# Patient Record
Sex: MALE | Race: WHITE | NOT HISPANIC OR LATINO | Employment: FULL TIME | URBAN - METROPOLITAN AREA
[De-identification: names, ages, dates, MRNs, and addresses within clinical notes are randomized per-mention and may not be internally consistent; named-entity substitution may affect disease eponyms.]

---

## 2023-03-31 ENCOUNTER — TELEPHONE (OUTPATIENT)
Dept: PRIMARY CARE | Facility: CLINIC | Age: 68
End: 2023-03-31
Payer: COMMERCIAL

## 2023-03-31 DIAGNOSIS — G47.00 INSOMNIA, UNSPECIFIED TYPE: ICD-10-CM

## 2023-03-31 RX ORDER — LEVOTHYROXINE SODIUM 75 UG/1
1 TABLET ORAL DAILY
COMMUNITY
Start: 2019-12-10 | End: 2023-12-27

## 2023-03-31 RX ORDER — FINASTERIDE 5 MG/1
1 TABLET, FILM COATED ORAL DAILY
COMMUNITY
Start: 2018-03-01 | End: 2023-11-07 | Stop reason: WASHOUT

## 2023-03-31 RX ORDER — PHENAZOPYRIDINE HYDROCHLORIDE 200 MG/1
200 TABLET, FILM COATED ORAL
COMMUNITY
Start: 2022-09-21 | End: 2023-11-07 | Stop reason: WASHOUT

## 2023-03-31 RX ORDER — NAPROXEN 500 MG/1
500 TABLET ORAL
COMMUNITY
Start: 2019-10-25 | End: 2023-10-09

## 2023-03-31 RX ORDER — ALFUZOSIN HYDROCHLORIDE 10 MG/1
10 TABLET, EXTENDED RELEASE ORAL NIGHTLY
COMMUNITY
End: 2023-11-07 | Stop reason: WASHOUT

## 2023-03-31 RX ORDER — PANTOPRAZOLE SODIUM 40 MG/1
1 TABLET, DELAYED RELEASE ORAL
COMMUNITY
Start: 2019-11-22 | End: 2023-11-28 | Stop reason: WASHOUT

## 2023-03-31 RX ORDER — HYDROXYZINE HYDROCHLORIDE 10 MG/1
10 TABLET, FILM COATED ORAL NIGHTLY PRN
Qty: 30 TABLET | Refills: 0 | Status: SHIPPED | OUTPATIENT
Start: 2023-03-31 | End: 2023-04-25

## 2023-03-31 RX ORDER — SILDENAFIL 100 MG/1
100 TABLET, FILM COATED ORAL AS NEEDED
COMMUNITY
Start: 2017-11-17 | End: 2023-12-22 | Stop reason: SDUPTHER

## 2023-03-31 RX ORDER — CHLORHEXIDINE GLUCONATE ORAL RINSE 1.2 MG/ML
SOLUTION DENTAL
COMMUNITY
Start: 2023-03-16 | End: 2023-11-07 | Stop reason: WASHOUT

## 2023-03-31 RX ORDER — FLUTICASONE PROPIONATE 50 MCG
2 SPRAY, SUSPENSION (ML) NASAL DAILY
COMMUNITY
Start: 2022-11-01 | End: 2023-11-07 | Stop reason: WASHOUT

## 2023-03-31 NOTE — TELEPHONE ENCOUNTER
Patient called stated that he had total hip replacement on 3/20 he is doing very well, he isn't on any pain meds anymore the only issue he is having that he cant stay asleep since the surgery he did call Dr hollingsworth and he stated he wouldn't put him on any sleeping med so he is calling you to see what he can do so he can stay asleep throughout the night. Advised him you were not in the office until Tuesday but I would send you a message.

## 2023-04-22 DIAGNOSIS — G47.00 INSOMNIA, UNSPECIFIED TYPE: ICD-10-CM

## 2023-04-25 RX ORDER — HYDROXYZINE HYDROCHLORIDE 10 MG/1
10 TABLET, FILM COATED ORAL NIGHTLY PRN
Qty: 30 TABLET | Refills: 2 | OUTPATIENT
Start: 2023-04-25 | End: 2023-11-28

## 2023-10-08 DIAGNOSIS — M25.551 PAIN IN RIGHT HIP: ICD-10-CM

## 2023-10-09 RX ORDER — NAPROXEN 500 MG/1
500 TABLET ORAL
Qty: 60 TABLET | Refills: 3 | Status: SHIPPED | OUTPATIENT
Start: 2023-10-09 | End: 2023-12-22 | Stop reason: SDUPTHER

## 2023-11-06 PROBLEM — R30.0 STRANGURIA: Status: ACTIVE | Noted: 2023-11-06

## 2023-11-06 PROBLEM — L81.4 OTHER MELANIN HYPERPIGMENTATION: Status: ACTIVE | Noted: 2018-01-31

## 2023-11-06 PROBLEM — N52.9 ERECTILE DYSFUNCTION: Status: ACTIVE | Noted: 2023-11-06

## 2023-11-06 PROBLEM — D64.9 ANEMIA: Status: ACTIVE | Noted: 2023-11-06

## 2023-11-06 PROBLEM — D17.0 BENIGN LIPOMATOUS NEOPLASM OF SKIN AND SUBCUTANEOUS TISSUE OF HEAD, FACE AND NECK: Status: ACTIVE | Noted: 2018-01-31

## 2023-11-06 PROBLEM — L72.3 SEBACEOUS CYST: Status: ACTIVE | Noted: 2018-01-31

## 2023-11-06 PROBLEM — E78.5 DYSLIPIDEMIA: Status: ACTIVE | Noted: 2023-11-06

## 2023-11-06 PROBLEM — E03.9 HYPOTHYROIDISM: Status: ACTIVE | Noted: 2023-11-06

## 2023-11-06 PROBLEM — D22.5 MELANOCYTIC NEVI OF TRUNK: Status: ACTIVE | Noted: 2018-01-31

## 2023-11-06 PROBLEM — H90.3 BILATERAL SENSORINEURAL HEARING LOSS: Status: ACTIVE | Noted: 2023-11-06

## 2023-11-06 PROBLEM — G47.33 OBSTRUCTIVE SLEEP APNEA: Status: ACTIVE | Noted: 2023-11-06

## 2023-11-06 PROBLEM — N40.1 BPH WITH OBSTRUCTION/LOWER URINARY TRACT SYMPTOMS: Status: ACTIVE | Noted: 2023-11-06

## 2023-11-06 PROBLEM — E29.1 HYPOGONADISM MALE: Status: ACTIVE | Noted: 2023-11-06

## 2023-11-06 PROBLEM — M72.2 PLANTAR FASCIITIS: Status: ACTIVE | Noted: 2023-11-06

## 2023-11-06 PROBLEM — D22.60 MELANOCYTIC NEVI OF UNSPECIFIED UPPER LIMB, INCLUDING SHOULDER: Status: ACTIVE | Noted: 2018-01-31

## 2023-11-06 PROBLEM — E78.5 HYPERLIPIDEMIA: Status: ACTIVE | Noted: 2023-11-06

## 2023-11-06 PROBLEM — N32.3 BLADDER DIVERTICULUM: Status: ACTIVE | Noted: 2023-11-06

## 2023-11-06 PROBLEM — Z99.89 CPAP (CONTINUOUS POSITIVE AIRWAY PRESSURE) DEPENDENCE: Status: ACTIVE | Noted: 2023-11-06

## 2023-11-06 PROBLEM — H91.90 HEARING LOSS: Status: ACTIVE | Noted: 2023-11-06

## 2023-11-06 PROBLEM — D18.01 HEMANGIOMA OF SKIN AND SUBCUTANEOUS TISSUE: Status: ACTIVE | Noted: 2018-01-31

## 2023-11-06 PROBLEM — M16.11 ARTHRITIS OF RIGHT HIP: Status: ACTIVE | Noted: 2023-11-06

## 2023-11-06 PROBLEM — M19.90 OSTEOARTHRITIS (ARTHRITIS DUE TO WEAR AND TEAR OF JOINTS): Status: ACTIVE | Noted: 2023-11-06

## 2023-11-06 PROBLEM — L85.3 XEROSIS CUTIS: Status: ACTIVE | Noted: 2018-01-31

## 2023-11-06 PROBLEM — N13.8 BPH WITH OBSTRUCTION/LOWER URINARY TRACT SYMPTOMS: Status: ACTIVE | Noted: 2023-11-06

## 2023-11-06 PROBLEM — E66.9 OBESITY WITH BODY MASS INDEX 30 OR GREATER: Status: ACTIVE | Noted: 2023-11-06

## 2023-11-06 PROBLEM — L57.0 ACTINIC KERATOSIS: Status: ACTIVE | Noted: 2018-01-31

## 2023-11-06 PROBLEM — M54.14 THORACIC RADICULITIS: Status: ACTIVE | Noted: 2023-11-06

## 2023-11-06 RX ORDER — IBUPROFEN 800 MG/1
800 TABLET ORAL EVERY 8 HOURS PRN
COMMUNITY
Start: 2023-08-18 | End: 2023-11-07 | Stop reason: WASHOUT

## 2023-11-06 RX ORDER — TOBRAMYCIN 3 MG/ML
SOLUTION/ DROPS OPHTHALMIC
COMMUNITY
End: 2023-11-28 | Stop reason: ALTCHOICE

## 2023-11-07 ENCOUNTER — OFFICE VISIT (OUTPATIENT)
Dept: SLEEP MEDICINE | Facility: CLINIC | Age: 68
End: 2023-11-07
Payer: COMMERCIAL

## 2023-11-07 VITALS
DIASTOLIC BLOOD PRESSURE: 89 MMHG | SYSTOLIC BLOOD PRESSURE: 152 MMHG | WEIGHT: 203 LBS | RESPIRATION RATE: 16 BRPM | TEMPERATURE: 98.7 F | HEIGHT: 69 IN | HEART RATE: 50 BPM | OXYGEN SATURATION: 99 % | BODY MASS INDEX: 30.07 KG/M2

## 2023-11-07 DIAGNOSIS — G47.33 OBSTRUCTIVE SLEEP APNEA: Primary | ICD-10-CM

## 2023-11-07 DIAGNOSIS — Z99.89 CPAP (CONTINUOUS POSITIVE AIRWAY PRESSURE) DEPENDENCE: ICD-10-CM

## 2023-11-07 PROCEDURE — 1036F TOBACCO NON-USER: CPT | Performed by: INTERNAL MEDICINE

## 2023-11-07 PROCEDURE — 99214 OFFICE O/P EST MOD 30 MIN: CPT | Performed by: INTERNAL MEDICINE

## 2023-11-07 PROCEDURE — 1159F MED LIST DOCD IN RCRD: CPT | Performed by: INTERNAL MEDICINE

## 2023-11-07 PROCEDURE — 1160F RVW MEDS BY RX/DR IN RCRD: CPT | Performed by: INTERNAL MEDICINE

## 2023-11-07 PROCEDURE — 3008F BODY MASS INDEX DOCD: CPT | Performed by: INTERNAL MEDICINE

## 2023-11-07 PROCEDURE — 1126F AMNT PAIN NOTED NONE PRSNT: CPT | Performed by: INTERNAL MEDICINE

## 2023-11-07 ASSESSMENT — SLEEP AND FATIGUE QUESTIONNAIRES
HOW LIKELY ARE YOU TO NOD OFF OR FALL ASLEEP WHILE WATCHING TV: SLIGHT CHANCE OF DOZING
ESS TOTAL SCORE: 4
HOW LIKELY ARE YOU TO NOD OFF OR FALL ASLEEP WHILE LYING DOWN TO REST IN THE AFTERNOON WHEN CIRCUMSTANCES PERMIT: MODERATE CHANCE OF DOZING
HOW LIKELY ARE YOU TO NOD OFF OR FALL ASLEEP IN A CAR, WHILE STOPPED FOR A FEW MINUTES IN TRAFFIC: NO CHANCE OF DOZING
HOW LIKELY ARE YOU TO NOD OFF OR FALL ASLEEP WHILE SITTING AND READING: SLIGHT CHANCE OF DOZING
ESS TOTAL SCORE: 4
HOW LIKELY ARE YOU TO NOD OFF OR FALL ASLEEP WHILE SITTING AND TALKING TO SOMEONE: NO CHANCE OF DOZING
HOW LIKELY ARE YOU TO NOD OFF OR FALL ASLEEP WHILE SITTING QUIETLY AFTER LUNCH WITHOUT ALCOHOL: NO CHANCE OF DOZING
HOW LIKELY ARE YOU TO NOD OFF OR FALL ASLEEP WHEN YOU ARE A PASSENGER IN A CAR FOR AN HOUR WITHOUT A BREAK: NO CHANCE OF DOZING
SITING INACTIVE IN A PUBLIC PLACE LIKE A CLASS ROOM OR A MOVIE THEATER: NO CHANCE OF DOZING

## 2023-11-07 NOTE — PATIENT INSTRUCTIONS
"Thank you for coming to the Sleep Medicine Clinic today! Your sleep medicine provider today was: Sharmaine Morocho MD Below is a summary of your treatment plan, patient education, other important information, and our contact numbers.      TREATMENT PLAN     - Start auto-CPAP. Order placed and sent to [facility].  - Please read the \"Patient Education\" section below for more detailed information. Try implementing tips, reminders, strategies, and supportive management.   - If not yet done, please sign up for [employer] to make a future schedule, send prescription requests, or send messages.    Additional patient handouts given today:   None    Referrals:  We are referring you the the following:  None    Follow-up Appointment:   Follow-up 31-90 days after getting new machine.    PATIENT EDUCATION     Obstructive Sleep Apnea (PHILLIP) is a sleep disorder where your upper airway muscles relax during sleep and the airway intermittently and repetitively narrows and collapses leading to partially blocked airway (hypopnea) or completely blocked airway (apnea) which, in turn, can disrupt breathing in sleep, lower oxygen levels while you sleep and cause night time wakings. Because both apnea and hypopnea may cause higher carbon dioxide or low oxygen levels, untreated PHILLIP can lead to heart arrhythmia, elevation of blood pressure, and make it harder for the body to consolidate memory and facilitate metabolism (leading to higher blood sugars at night). Frequent partial arousals occur during sleep resulting in sleep deprivation and daytime sleepiness. PHILLIP is associated with an increased risk of cardiovascular disease, stroke, hypertension, and insulin resistance. Moreover, untreated PHILLIP with excessive daytime sleepiness can increase the risk of motor vehicular accidents.    Some conservative strategies for PHILLIP regardless of PHILLIP severity are:   Positional therapy - Avoid sleeping on your back.   Healthy diet and regular exercise to optimize weight " is highly encouraged.   Avoid alcohol late in the evening and sedative-hypnotics as these substances can make sleep apnea worse.   Improve breathing through the nose with intranasal steroid spray, saline rinse, or antihistamines    Safety: Avoid driving vehicle and operating heavy equipment while sleepy. Drowsy driving may lead to life-threatening motor vehicle accidents. A person driving while sleepy is 5 times more likely to have an accident. If you feel sleepy, pull over and take a short power nap (sleep for less than 30 minutes). Otherwise, ask somebody to drive you.    Treatment options for sleep apnea include weight management, positional therapy, Positive Airway Therapy (PAP) therapy, oral appliance therapy, hypoglossal nerve stimulator (Inspire) and select airway surgeries.    Annual Reminders About Your Sleep Apnea    Your sleep apnea is well controlled based on reviewing your PAP Data Report.     General Reminders:  Continue current machine settings. Continue using machine every night. Need at least 4 hours daily usage.   Remember to clean your mask, tubings, and water chamber regularly as instructed.   Know the replacement schedule of your supplies/ accessories and contact your DME (durable medical equipment) provider if you are due for them.   Avoid driving or operating heavy machinery when drowsy. A person driving while sleepy is 5 times more likely to have an accident. If you feel sleepy, pull over and take a short power nap (sleep for less than 30 minutes). Otherwise, ask somebody to drive you.    [facility] or calling our office if you have any of the following symptoms:  Snoring or stopping breathing while using the machine  Recurrence of fatigue, sleepiness, insomnia, and other symptoms you had prior using machine  Persistent or worsening fatigue or sleepiness despite regular use of machine  Issues tolerating the machine like bloating sensation, air hunger, too much hot air,  too much pressure, taking off mask without recall in the middle of sleep, etc.     Other conservative measures to improve sleep apnea:  Losing weight can lead to some improvement of PHILLIP which means you will need lower pressures in machine to control your PHILLIP. In some patients, they don't need to use the machine after bariatric surgery. Hence, consider medical and/or surgical weight loss especially if your BMI is more than 35.  Avoiding alcohol, sedative-hypnotics, and sedating medications is imperative as these substances can worsen snoring and sleep apnea  If you have nasal congestion or seasonal allergies, improving your breathing through the nose is critical for treating sleep apnea, tolerating CPAP, and improving your sleep; hence, using intranasal steroid spray like Flonase might help. Make sure you know the proper way to use it.  Stay off your back when sleeping.    Common issues with PAP machine:  Mask gets dislodged when turning to the side: Consider getting a CPAP pillow or switching to a mask with hose on top.     Dry mouth:  Your machine has built-in humidifier that heats up the air to prevent dry mouth. It can be adjusted to your comfort. You can try that first and increase setting one level one night at a time to check which setting is comfortable and effective in lessening dry mouth. In some patients with heated hose, adjusting tube temperature to make air warmer can improve dry mouth. If dry mouth persists despite adjusting humidity or tube temperature setting, may apply OTC Biotene gel over the gums at bedtime.  If Biotene gel is not effective, consider trying XEROSTOM gel from [employer].  Also, eliminate or reduce dose of medications that can cause dry mouth if possible. Lastly, may try getting a separate room humidifier machine.    Airleaks: Please call DME as they may need to adjust your mask or refit you with a different kind or different size of mask. In addition, you can ask DME for tips on  "getting a good mask seal and mask fit.     Difficulty tolerating the mask: Contact your DME to try a different kind of mask and/or call office to get a referral to Sleep Psychologist for CPAP desensitization. CPAP desensitization technique is a set of strategies that helps patient cope with claustrophobia and anxiety related to wearing mask. Alternatively, we can do a daytime mini-sleep study called PAP-nap trial wherein you will try on different kinds of mask and the sleep technician will try different pressure settings on CPAP and BPAP machines to see which specific pressure is tolerable and comfortable for you.     Water droplets or moisture within the hose and/or mask: This is called rain-out and it is caused by condensation of too much heated humidity on the cooler walls of the hose. If you have rain-out, turn down humidity settings or get a heated hose. If you already have a heated hose, turn up the \"tube temperature\" of the heated hose. Alternatively, if you don't want to get a heated hose or warmer air, may wrap the CPAP hose with stockings to keep it somewhat warm. Also, you need to place the machine on the floor and lower the hose so that water won't travel upward towards your mask.     Maintaining your CPAP/BPAP device:    The humidification chamber (aka water tank or water chamber) needs to be filled with distilled water to prevent buildup of white deposits in the future. If you cannot find distilled water, you can use tap water but expect to have white deposits buildup seen after prolonged use with tap water. If you start seeing white deposits on the water chamber, you can clean it by filling it with equal parts of distilled white vinegar and water. Let the vinegar-water mixture sit for 2 hours, and then rinse it with running tap water. Clean with soap and water then let it dry.     You should try to keep your machine clean in order to work well. Here are some tips to clean PAP supplies / " accessories:    Clean the humidification chamber (aka water tank) as well as your mask and tubing at least once a week with soap and water.   Alternatively, you can fill a sink or basin with warm water and add a little mild detergent, like Ivory dish soap. Gently wipe your supplies with the soapy water to free all the oils and dirt that may have collected. Once that's done, rinse these items with clean water until the soap is gone and let them air dry. You can hang your tubing over the curtain marialuisa in your bathroom so that it dries.  The mask insert (part of the mask that has contact with your skin) needs to be cleaned with soap and water daily. Another option is to wipe them down with CPAP wipes or baby wipes.    You should replace your mask and tubing frequently in order to prevent bacteria buildup, machine damage, and mask seal issues. The older the mask and hose, the high likelihood that there is bacteria buildup in it especially if they are not cleaned regularly. Dirty filters damage machines because build-up of dust and contaminants can cause machine to over-heat, and in time, damage the motor of machine. Cushions lose their seal over time as most masks are made of plastic and silicone while headgear is made of neoprene. These materials will break down with age and frequent use. Here is the recommended replacement schedule for PAP supplies / accessories:    Twice a month- disposable filters and cushions for nasal mask or nasal pillows.  Once a month- cushion for full face mask  Every 3 months- mask with headgear and PAP tubing (standard or heated hose)  Every 6 months- reusable filter, water chamber, and chin strap     Other useful information:    Some people do not put water in the tank while other people prefers to put water in the tank to prevent mouth dryness. Try to experiment to determine which is more comfortable for you.   In general, new machines have 2 years warranty on parts while health insurance  allows you to have a new machine once every 5 years.     You can also go to the following EDUCATION WEBSITES for further information:   American Academy of Sleep Medicine http://sleepeducation.org  National Sleep Foundation: https://sleepfoundation.org  American Sleep Apnea Association: https://www.sleepapnea.org (for patients with sleep apnea)  Narcolepsy Network: https://www.narcolepsynetwork.org (for patients with narcolepsy)  [employer]: https://www.theDropcolepsy.org (for patients with narcolepsy)  Hypersomnia Foundation: https://www.hypersomniafoundation.org (for patients with idiopathic hypersomnia)  RLS foundation: https://www.rls.org (for patients with restless leg syndrome)    IMPORTANT INFORMATION     Call 911 for medical emergencies.  Our offices are generally open from Monday-Friday, 8 am - 5 pm.   There are no supporting services by either the sleep doctors or their staff on weekends and Holidays, or after 5 PM on weekdays.   If you need to get in touch with me, you may either call my office number or you can use [employer].  If a referral for a test, for CPAP, or for another specialist was made, and you have not heard about scheduling this within a week, please call scheduling at [phone] ([phone]).  If you are unable to make your appointment for clinic or an overnight study, kindly call the office or sleep testing center at least 48 hours in advance to cancel and reschedule.  If you are on CPAP, please bring your device's card and/or the device to each clinic appointment.   In case of problems with PAP machine or mask interface, please contact your [employer] (Durable Medical Equipment) company first. [employer] is the company who provides you the machine and/or PAP supplies.       PRESCRIPTIONS     We require 7 days advanced notice for prescription refills. If we do not receive the request in this time, we cannot guarantee that your medication will be refilled in time.    IMPORTANT PHONE NUMBERS      Sleep Medicine Clinic Fax: [phone]  Appointments (for Pediatric Sleep Clinic): [phone] - option 1  Appointments (for Adult Sleep Clinic): [phone] - option 2  Appointments (For Sleep Studies): [phone] - option 3  Behavioral Sleep Medicine: [phone]  Sleep Surgery: [phone]  Nutrition Service: [phone]  Weight management clinics with endocrinology: [phone]  Bariatric Services: [phone] (includes weight loss medications and weight loss surgery)  [facility]: [phone] (offers holistic approaches to weight management)  ENT (Otolaryngology): [phone]  Headache Clinic (Neurology): [phone]  Neurology: [phone]  Psychiatry: [phone]  Pulmonary Function Testing (PFT) Center: [phone]  Pulmonary Medicine: [phone]  [facility]): [phone]      OUR SLEEP TESTING LOCATIONS     Our team will contact you to schedule your sleep study, however, you can contact us as follow:  Main Phone Line (scheduling only): [phone]), option 3  Adult and Pediatric Locations  [facility] (6 years and older): [facility] - 4th floor ([address] [place] [address]) After hours line: [phone]  [facility] (Main campus: All ages): [facility], 6th floor. After hours line: [phone]   [place] (5 years and older; younger considered on case-by-case basis): [address]; [address], [address]. Scheduling  After hours line: [phone]   [place] (6 years and older): [address]; [address]; [address]   [place] (6 years and older): [address] [facility], [address]  After hours line: [phone]   [facility] (13 years and older) in [place]: [address], 2nd floor  After hours line: [phone]  [facility] (13 year and older): [address] [facility] [address]  After hours line: [phone]     Adult Only  Normal vision: sees adequately in most situations; can see medication labels, newsprint "Locations:   [place] (18 years and older): [address] [place] [address], 2nd floor   [place] (18 years and older): [address] [facility]; 4th floor  After hours line: [phone]   [place] (18 years and older) at [place]: [id number] [place] [address]  After hours line: [phone]     CONTACTING YOUR SLEEP MEDICINE PROVIDER AND SLEEP TEAM      For issues with your machine or mask interface, please call your DME provider first. DME stands for durable medical company. DME is the company who provides you the machine and/or PAP supplies / accessories.   To schedule, cancel, or reschedule SLEEP STUDY APPOINTMENTS, please call the Main Phone Line at [phone] - option 3.   To schedule, cancel, or reschedule CLINIC APPOINTMENTS, you can do it in \"MyChart\", call [phone] to speak with my  (Cassandra Cavazos), or call the Main Phone Line at [phone] - option 2  For CLINICAL QUESTIONS or MEDICATION REFILLS, please call direct line for Adult Sleep Nurses at [phone].   Lastly, you can also send a message directly to your provider through \"My Chart\", which is the email service through your  Records Account: [email or web address]       Here at [facility], we wish you a restful sleep!    "

## 2023-11-07 NOTE — ASSESSMENT & PLAN NOTE
Now on auto-CPAP 8-16 cm H2O and EPR 3  Doing well with improved PHILLIP symptoms.   PAP adherence data reviewed today. Usage days 30/30, days> 4 hours at 100%, residual AHI 4.1.   Due to machine issues beyond repair, recommend getting a new machine such as auto-CPAP at the following settings: 8-16 cm H2O with EPR 3, heated humidification, heated tubings, and mask of preference. Orders sent to [facility].  Discussed 30-day mask guarantee and insurance requirement regarding PAP compliance and follow-up.   Continue supportive management as follows: Lose weight. Stay off your back when sleeping. Avoid smoking, alcohol, and sedating medications if applicable. Don't drive when sleepy.

## 2023-11-07 NOTE — PROGRESS NOTES
[facility] SLEEP MEDICINE CLINIC  FOLLOW-UP VISIT NOTE    Clinic Location: [facility]  Service Date: 11/7/2023  PCP: Austin Bahena MD    HISTORY OF PRESENT ILLNESS     Patient ID: Wisam Olson is a 68 y.o. male who presents for follow-up of PHILLIP on PAP, yearly checkup.     Patient is here alone today.  To review, patient's medical history is notable for obesity (BMI 32), hypothyroidism, sensorineural hearing loss, BPH, ED, and PHILLIP on auto-CPAP.     Interval History  Patient was last seen in 11/1/2022. Plan was to continue PAP and follow-up yearly.  Since last visit, patient has been using machine every night with clinical benefit and no issues on machine. Tolerating pressure, mask, and humidity. Current mask interface being used is Dreamwear nasal mask. Per records, patient is getting supplies thru [employer]   Complains of issues with machine and dry mouth. Per patient, his machine's humidifier is not working as the water level on water tank remains the same even after an 8-hour night use.    The following are patient's perceived benefits of PAP: decreased or no snoring, refreshing sleep, decreased daytime sleepiness and/or fatigue, and better quality of sleep.  He denies symptoms of RLS, parasomnias, and shift-work disorder.     SLEEP STUDY HISTORY (personally reviewed raw data such as interpretation report, data sheet, hypnogram, and titration table if available and applicable)  HSAT 5/14/2013: RDI 21.4, SpO2 brien 78%, time spent at SpO2<89% was 30 minutes  PAP titration 5/10/2018: CPAP was started at 4 to 13 cm H2O. Treatment emergent central apneas noted. No Cheyne-Bardales breathing noted. Overall AHI 17.8, central apnea index 8.7. At CPAP 13 cm H2O with REM supine sleep, AHI 31.4, SpO2 brien 86%. PLMS index 20.3.    PAP Adherence  A PAP adherence data was obtained and reviewed personally today in clinic. (see scanned document in EPIC)  Source of data:  "website download  Machine: [employer]med Airsense 10 Autoset  Setup date: 6/2/2018  Settings:  auto-CPAP 8-16 cm H2O and EPR 3  Date Range: 10/7/2023 to 11/5/2023  Days used: 30/30  >4 hrs usage: 100%  Average usage hours (days used): 7 hours 29 minutes  Pressure: Median 9.5, 95th percentile 11, Maximum 12  Leak: 95th percentile 14.4, maximum 24.3  AHI: 4.1 (RERA index 0, KESHA 2.4)    SLEEP-WAKE SCHEDULE  Bedtime:  10 PM to 10:30 PM daily  Subjective sleep latency: 10 minutes  Difficulty falling asleep: No   Number of awakenings: none. Patient sleeps thru the night after the prostate surgery.  Nocturia: No  Difficulty staying asleep: No   Final wake time:  6:30 AM daily, except onWed and Fri, he plays hockey and has to wake up at 4:45 AM  Out of bed time: 6:30 AM daily, except onWed and Fri, he plays hockey and has to wake up at 4:45 AM  Shift work: No   Naps: no  Average sleep duration (excluding naps): 6-8 hours    SLEEP ENVIRONMENT  Sleep location: bed  Sleep status: sleeps with bed partner  Preferred sleep position: back and side  TV in bedroom: Yes  Room is dark:  Yes  Room is quiet: Yes  Room is cool: Yes    SOCIAL HISTORY  Smoking: no   ETOH: 4 drinks per week  Marijuana: no  Caffeine: 3-4 cups caffeine daily  Sleep aids: no    WEIGHT: lost 13 lbs     Claustrophobia: No     Active Problems, Allergy List, Medication List, and PMH/PSH/FH/Social Hx have been reviewed and reconciled in chart. No significant changes unless documented in the pertinent chart section. Updates made when necessary.     PHYSICAL EXAM     VITAL SIGNS: /89   Pulse 50   Temp 37.1 °C (98.7 °F)   Resp 16   Ht 1.753 m (5' 9\")   Wt 92.1 kg (203 lb)   SpO2 99% Comment: RA  BMI 29.98 kg/m²     NECK CIRCUMFERENCE: 17 inches    Today ESS: 4  Last visit ESS: 3  JERSEY: 2    Physical Exam  Constitutional: Awake, not in distress  Head: normocephalic, atraumatic  Skin: Warm, no rash  Neuro: No tremors, moves all extremities  Psych: alert and " oriented to time, place, and person    RESULTS/DATA     Iron (ug/dL)   Date Value   07/25/2019 94     Ferritin (ug/L)   Date Value   07/25/2019 173       Bicarbonate   Date Value Ref Range Status   03/16/2023 27 21 - 32 mmol/L Final       ASSESSMENT/PLAN     Assessment/Plan   Problem List Items Addressed This Visit             ICD-10-CM    CPAP (continuous positive airway pressure) dependence Z99.89    Obstructive sleep apnea - Primary G47.33     Now on auto-CPAP 8-16 cm H2O and EPR 3  Doing well with improved PHILLIP symptoms.   PAP adherence data reviewed today. Usage days 30/30, days> 4 hours at 100%, residual AHI 4.1.   Due to machine issues beyond repair, recommend getting a new machine such as auto-CPAP at the following settings: 8-16 cm H2O with EPR 3, heated humidification, heated tubings, and mask of preference. Orders sent to [facility].  Discussed 30-day mask guarantee and insurance requirement regarding PAP compliance and follow-up.   Continue supportive management as follows: Lose weight. Stay off your back when sleeping. Avoid smoking, alcohol, and sedating medications if applicable. Don't drive when sleepy.           Relevant Orders    Positive Airway Pressure (PAP) Therapy       All of patient's questions were answered. He verbalizes understanding and agreement with my assessment and plan. Refer to after-visit-summary (AVS) for patient education and more details on troubleshooting issues with PAP usage, proper cleaning instructions of PAP supplies, and usual recommended replacement schedule for each of the PAP supplies.     Follow-up 31-90 days after getting new machine.

## 2023-11-28 ENCOUNTER — HOSPITAL ENCOUNTER (OUTPATIENT)
Dept: OPERATING ROOM | Facility: CLINIC | Age: 68
Setting detail: OUTPATIENT SURGERY
Discharge: HOME | End: 2023-11-28
Payer: COMMERCIAL

## 2023-11-28 VITALS
RESPIRATION RATE: 16 BRPM | TEMPERATURE: 97.7 F | SYSTOLIC BLOOD PRESSURE: 124 MMHG | DIASTOLIC BLOOD PRESSURE: 68 MMHG | HEIGHT: 69 IN | HEART RATE: 56 BPM | BODY MASS INDEX: 30.76 KG/M2 | WEIGHT: 207.67 LBS | OXYGEN SATURATION: 98 %

## 2023-11-28 DIAGNOSIS — Z12.11 ENCOUNTER FOR SCREENING FOR MALIGNANT NEOPLASM OF COLON: Primary | ICD-10-CM

## 2023-11-28 PROBLEM — Z00.00 MEDICARE ANNUAL WELLNESS VISIT, SUBSEQUENT: Status: ACTIVE | Noted: 2023-11-28

## 2023-11-28 PROCEDURE — 2500000004 HC RX 250 GENERAL PHARMACY W/ HCPCS (ALT 636 FOR OP/ED): Performed by: INTERNAL MEDICINE

## 2023-11-28 PROCEDURE — 45378 DIAGNOSTIC COLONOSCOPY: CPT | Performed by: INTERNAL MEDICINE

## 2023-11-28 PROCEDURE — 3600000002 HC OR TIME - INITIAL BASE CHARGE - PROCEDURE LEVEL TWO

## 2023-11-28 PROCEDURE — 3600000007 HC OR TIME - EACH INCREMENTAL 1 MINUTE - PROCEDURE LEVEL TWO

## 2023-11-28 PROCEDURE — 7100000009 HC PHASE TWO TIME - INITIAL BASE CHARGE

## 2023-11-28 PROCEDURE — 7100000010 HC PHASE TWO TIME - EACH INCREMENTAL 1 MINUTE

## 2023-11-28 PROCEDURE — 99152 MOD SED SAME PHYS/QHP 5/>YRS: CPT | Mod: 59

## 2023-11-28 RX ORDER — MIDAZOLAM HYDROCHLORIDE 1 MG/ML
INJECTION, SOLUTION INTRAMUSCULAR; INTRAVENOUS AS NEEDED
Status: COMPLETED | OUTPATIENT
Start: 2023-11-28 | End: 2023-11-28

## 2023-11-28 RX ORDER — SODIUM CHLORIDE, SODIUM LACTATE, POTASSIUM CHLORIDE, CALCIUM CHLORIDE 600; 310; 30; 20 MG/100ML; MG/100ML; MG/100ML; MG/100ML
20 INJECTION, SOLUTION INTRAVENOUS CONTINUOUS
Status: CANCELLED | OUTPATIENT
Start: 2023-11-28

## 2023-11-28 RX ORDER — FENTANYL CITRATE 50 UG/ML
INJECTION, SOLUTION INTRAMUSCULAR; INTRAVENOUS AS NEEDED
Status: COMPLETED | OUTPATIENT
Start: 2023-11-28 | End: 2023-11-28

## 2023-11-28 RX ADMIN — FENTANYL CITRATE 50 MCG: 50 INJECTION, SOLUTION INTRAMUSCULAR; INTRAVENOUS at 08:38

## 2023-11-28 RX ADMIN — FENTANYL CITRATE 25 MCG: 50 INJECTION, SOLUTION INTRAMUSCULAR; INTRAVENOUS at 08:42

## 2023-11-28 RX ADMIN — MIDAZOLAM 2 MG: 1 INJECTION INTRAMUSCULAR; INTRAVENOUS at 08:38

## 2023-11-28 RX ADMIN — MIDAZOLAM 1 MG: 1 INJECTION INTRAMUSCULAR; INTRAVENOUS at 08:42

## 2023-11-28 ASSESSMENT — PAIN SCALES - GENERAL
PAINLEVEL_OUTOF10: 0 - NO PAIN

## 2023-11-28 ASSESSMENT — COLUMBIA-SUICIDE SEVERITY RATING SCALE - C-SSRS
1. IN THE PAST MONTH, HAVE YOU WISHED YOU WERE DEAD OR WISHED YOU COULD GO TO SLEEP AND NOT WAKE UP?: NO
2. HAVE YOU ACTUALLY HAD ANY THOUGHTS OF KILLING YOURSELF?: NO
6. HAVE YOU EVER DONE ANYTHING, STARTED TO DO ANYTHING, OR PREPARED TO DO ANYTHING TO END YOUR LIFE?: NO

## 2023-11-28 ASSESSMENT — PAIN - FUNCTIONAL ASSESSMENT: PAIN_FUNCTIONAL_ASSESSMENT: 0-10

## 2023-11-28 NOTE — H&P
Error History Of Present Illness  Wisam Olson is a 68 y.o. male presenting for screening colonoscopy.  Last colonoscopy 10 years ago.     Past Medical History  Past Medical History:   Diagnosis Date    Contact with and (suspected) exposure to covid-19     Exposure to COVID-19 virus    Fever, unspecified 01/15/2021    Low grade fever    Pain in unspecified foot 02/23/2015    Heel pain    Personal history of other diseases of the digestive system     History of gastrointestinal hemorrhage    Personal history of other diseases of the respiratory system 01/22/2021    History of upper respiratory infection    Personal history of pneumonia (recurrent) 04/24/2019    History of community acquired pneumonia    Prostate cancer (CMS/HCC)     Shortness of breath     SOB (shortness of breath) on exertion    Trigger finger, unspecified finger 11/15/2016    Acquired trigger finger    Unspecified injury of thorax, initial encounter 06/20/2018    Rib injury    Unspecified injury of unspecified foot, initial encounter 02/23/2015    Injury of heel    Unspecified injury of unspecified wrist, hand and finger(s), initial encounter 04/14/2014    Wrist injury    Unspecified injury of unspecified wrist, hand and finger(s), initial encounter 04/24/2014    Hand injury       Surgical History  Past Surgical History:   Procedure Laterality Date    COLONOSCOPY  01/05/2015    Complete Colonoscopy    OTHER SURGICAL HISTORY  10/07/2021    Tonsillectomy with adenoidectomy    PROSTATE SURGERY      REFRACTIVE SURGERY  05/10/2013    Corneal LASIK        Social History  He reports that he has quit smoking. His smoking use included cigarettes. He has never used smokeless tobacco. He reports that he does not drink alcohol and does not use drugs.    Family History  Family History   Problem Relation Name Age of Onset    Aneurysm Mother      Alcohol abuse Brother          Allergies  Codeine and Penicillins    Review of Systems  A 10+ point review of systems was  "completed and otherwise negative.      Physical Exam  CONSTITUTIONAL: no acute distress, appears stated age  PULMONARY: clear to auscultation bilaterally  CARDIOVASCULAR: regular rate and rhythm  ABDOMEN: soft, non-tender  NEUROLOGIC: alert and oriented to person/place/time       Last Recorded Vitals  Blood pressure 144/75, pulse 60, temperature 36.4 °C (97.5 °F), temperature source Temporal, resp. rate 20, height 1.753 m (5' 9\"), weight 94.2 kg (207 lb 10.8 oz), SpO2 95 %.    Relevant Results           Assessment/Plan   Active Problems:  There are no active Hospital Problems.      Will proceed with screening colonoscopy             Rico Hogue MD    "

## 2023-11-30 ENCOUNTER — ANCILLARY PROCEDURE (OUTPATIENT)
Dept: RADIOLOGY | Facility: CLINIC | Age: 68
End: 2023-11-30
Payer: COMMERCIAL

## 2023-11-30 DIAGNOSIS — E78.5 HYPERLIPIDEMIA, UNSPECIFIED: ICD-10-CM

## 2023-11-30 PROCEDURE — 75571 CT HRT W/O DYE W/CA TEST: CPT

## 2023-12-22 ENCOUNTER — OFFICE VISIT (OUTPATIENT)
Dept: PRIMARY CARE | Facility: CLINIC | Age: 68
End: 2023-12-22
Payer: COMMERCIAL

## 2023-12-22 VITALS
OXYGEN SATURATION: 96 % | SYSTOLIC BLOOD PRESSURE: 130 MMHG | HEART RATE: 61 BPM | DIASTOLIC BLOOD PRESSURE: 76 MMHG | RESPIRATION RATE: 16 BRPM | BODY MASS INDEX: 31.19 KG/M2 | TEMPERATURE: 97.2 F | WEIGHT: 210.6 LBS | HEIGHT: 69 IN

## 2023-12-22 DIAGNOSIS — R91.1 LUNG NODULE: ICD-10-CM

## 2023-12-22 DIAGNOSIS — M25.551 PAIN IN RIGHT HIP: ICD-10-CM

## 2023-12-22 DIAGNOSIS — Z00.00 MEDICARE ANNUAL WELLNESS VISIT, SUBSEQUENT: ICD-10-CM

## 2023-12-22 DIAGNOSIS — Z12.5 SPECIAL SCREENING FOR MALIGNANT NEOPLASM OF PROSTATE: ICD-10-CM

## 2023-12-22 DIAGNOSIS — N52.34 ERECTILE DYSFUNCTION FOLLOWING SIMPLE PROSTATECTOMY: Primary | ICD-10-CM

## 2023-12-22 DIAGNOSIS — D64.9 ANEMIA, UNSPECIFIED TYPE: ICD-10-CM

## 2023-12-22 DIAGNOSIS — E66.9 CLASS 1 OBESITY WITH BODY MASS INDEX (BMI) OF 31.0 TO 31.9 IN ADULT, UNSPECIFIED OBESITY TYPE, UNSPECIFIED WHETHER SERIOUS COMORBIDITY PRESENT: ICD-10-CM

## 2023-12-22 DIAGNOSIS — Z78.9 NEVER SMOKED CIGARETTES: ICD-10-CM

## 2023-12-22 DIAGNOSIS — Z23 NEED FOR VACCINATION: ICD-10-CM

## 2023-12-22 PROBLEM — N13.8 BPH WITH OBSTRUCTION/LOWER URINARY TRACT SYMPTOMS: Status: RESOLVED | Noted: 2023-11-06 | Resolved: 2023-12-22

## 2023-12-22 PROBLEM — M72.2 PLANTAR FASCIITIS: Status: RESOLVED | Noted: 2023-11-06 | Resolved: 2023-12-22

## 2023-12-22 PROBLEM — N40.1 BPH WITH OBSTRUCTION/LOWER URINARY TRACT SYMPTOMS: Status: RESOLVED | Noted: 2023-11-06 | Resolved: 2023-12-22

## 2023-12-22 PROBLEM — E29.1 HYPOGONADISM MALE: Status: RESOLVED | Noted: 2023-11-06 | Resolved: 2023-12-22

## 2023-12-22 LAB
ALBUMIN SERPL BCP-MCNC: 4.4 G/DL (ref 3.4–5)
ALP SERPL-CCNC: 51 U/L (ref 33–136)
ALT SERPL W P-5'-P-CCNC: 16 U/L (ref 10–52)
ANION GAP SERPL CALC-SCNC: 13 MMOL/L (ref 10–20)
AST SERPL W P-5'-P-CCNC: 17 U/L (ref 9–39)
BILIRUB SERPL-MCNC: 0.8 MG/DL (ref 0–1.2)
BUN SERPL-MCNC: 26 MG/DL (ref 6–23)
CALCIUM SERPL-MCNC: 9.3 MG/DL (ref 8.6–10.3)
CHLORIDE SERPL-SCNC: 105 MMOL/L (ref 98–107)
CHOLEST SERPL-MCNC: 193 MG/DL (ref 0–199)
CHOLESTEROL/HDL RATIO: 4
CO2 SERPL-SCNC: 27 MMOL/L (ref 21–32)
CREAT SERPL-MCNC: 1.13 MG/DL (ref 0.5–1.3)
ERYTHROCYTE [DISTWIDTH] IN BLOOD BY AUTOMATED COUNT: 16.1 % (ref 11.5–14.5)
GFR SERPL CREATININE-BSD FRML MDRD: 71 ML/MIN/1.73M*2
GLUCOSE SERPL-MCNC: 77 MG/DL (ref 74–99)
HCT VFR BLD AUTO: 38.3 % (ref 41–52)
HDLC SERPL-MCNC: 48.1 MG/DL
HGB BLD-MCNC: 11.9 G/DL (ref 13.5–17.5)
LDLC SERPL CALC-MCNC: 124 MG/DL
MCH RBC QN AUTO: 21.2 PG (ref 26–34)
MCHC RBC AUTO-ENTMCNC: 31.1 G/DL (ref 32–36)
MCV RBC AUTO: 68 FL (ref 80–100)
NON HDL CHOLESTEROL: 145 MG/DL (ref 0–149)
NRBC BLD-RTO: 0 /100 WBCS (ref 0–0)
PLATELET # BLD AUTO: 213 X10*3/UL (ref 150–450)
POTASSIUM SERPL-SCNC: 4.8 MMOL/L (ref 3.5–5.3)
PROT SERPL-MCNC: 7 G/DL (ref 6.4–8.2)
RBC # BLD AUTO: 5.61 X10*6/UL (ref 4.5–5.9)
SODIUM SERPL-SCNC: 140 MMOL/L (ref 136–145)
TRIGL SERPL-MCNC: 104 MG/DL (ref 0–149)
VLDL: 21 MG/DL (ref 0–40)
WBC # BLD AUTO: 6.4 X10*3/UL (ref 4.4–11.3)

## 2023-12-22 PROCEDURE — 36415 COLL VENOUS BLD VENIPUNCTURE: CPT

## 2023-12-22 PROCEDURE — 84153 ASSAY OF PSA TOTAL: CPT

## 2023-12-22 PROCEDURE — 83550 IRON BINDING TEST: CPT

## 2023-12-22 PROCEDURE — 82728 ASSAY OF FERRITIN: CPT

## 2023-12-22 PROCEDURE — 80053 COMPREHEN METABOLIC PANEL: CPT

## 2023-12-22 PROCEDURE — 90471 IMMUNIZATION ADMIN: CPT | Performed by: FAMILY MEDICINE

## 2023-12-22 PROCEDURE — 1036F TOBACCO NON-USER: CPT | Performed by: FAMILY MEDICINE

## 2023-12-22 PROCEDURE — 90662 IIV NO PRSV INCREASED AG IM: CPT | Performed by: FAMILY MEDICINE

## 2023-12-22 PROCEDURE — 99397 PER PM REEVAL EST PAT 65+ YR: CPT | Performed by: FAMILY MEDICINE

## 2023-12-22 PROCEDURE — 1126F AMNT PAIN NOTED NONE PRSNT: CPT | Performed by: FAMILY MEDICINE

## 2023-12-22 PROCEDURE — 1159F MED LIST DOCD IN RCRD: CPT | Performed by: FAMILY MEDICINE

## 2023-12-22 PROCEDURE — 83540 ASSAY OF IRON: CPT

## 2023-12-22 PROCEDURE — 1160F RVW MEDS BY RX/DR IN RCRD: CPT | Performed by: FAMILY MEDICINE

## 2023-12-22 PROCEDURE — 80061 LIPID PANEL: CPT

## 2023-12-22 PROCEDURE — 85027 COMPLETE CBC AUTOMATED: CPT

## 2023-12-22 PROCEDURE — 3008F BODY MASS INDEX DOCD: CPT | Performed by: FAMILY MEDICINE

## 2023-12-22 RX ORDER — NAPROXEN 500 MG/1
500 TABLET ORAL
Qty: 90 TABLET | Refills: 3 | Status: SHIPPED | OUTPATIENT
Start: 2023-12-22

## 2023-12-22 RX ORDER — SILDENAFIL 100 MG/1
100 TABLET, FILM COATED ORAL AS NEEDED
Qty: 30 TABLET | Refills: 11 | Status: SHIPPED | OUTPATIENT
Start: 2023-12-22 | End: 2024-12-21

## 2023-12-22 ASSESSMENT — ENCOUNTER SYMPTOMS
HEMATURIA: 0
DEPRESSION: 0
HALLUCINATIONS: 0
SHORTNESS OF BREATH: 0
BACK PAIN: 0
ABDOMINAL PAIN: 0
DYSURIA: 0
COUGH: 0
EYE REDNESS: 0
EYE PAIN: 0
OCCASIONAL FEELINGS OF UNSTEADINESS: 0
HEADACHES: 0
LOSS OF SENSATION IN FEET: 0
PALPITATIONS: 0
BRUISES/BLEEDS EASILY: 0
FEVER: 0
ADENOPATHY: 0
CHILLS: 0
RHINORRHEA: 0
NECK STIFFNESS: 0
POLYDIPSIA: 0
WOUND: 0
FATIGUE: 0
WEAKNESS: 0
BLOOD IN STOOL: 0
SORE THROAT: 0

## 2023-12-22 ASSESSMENT — PROMIS GLOBAL HEALTH SCALE
EMOTIONAL_PROBLEMS: NEVER
RATE_PHYSICAL_HEALTH: EXCELLENT
RATE_AVERAGE_FATIGUE: MILD
RATE_QUALITY_OF_LIFE: EXCELLENT
RATE_GENERAL_HEALTH: VERY GOOD
RATE_AVERAGE_PAIN: 0
RATE_MENTAL_HEALTH: EXCELLENT
RATE_SOCIAL_SATISFACTION: EXCELLENT
CARRYOUT_SOCIAL_ACTIVITIES: EXCELLENT
CARRYOUT_PHYSICAL_ACTIVITIES: COMPLETELY

## 2023-12-22 ASSESSMENT — PATIENT HEALTH QUESTIONNAIRE - PHQ9
1. LITTLE INTEREST OR PLEASURE IN DOING THINGS: NOT AT ALL
2. FEELING DOWN, DEPRESSED OR HOPELESS: NOT AT ALL
SUM OF ALL RESPONSES TO PHQ9 QUESTIONS 1 AND 2: 0

## 2023-12-22 NOTE — PROGRESS NOTES
Wisam Olson is a 68 y.o. male with Chief Complaint of Annual Exam (CPE).    Past Surgical History:   Procedure Laterality Date    COLONOSCOPY  2015    Complete Colonoscopy    JOINT REPLACEMENT      OTHER SURGICAL HISTORY  10/07/2021    Tonsillectomy with adenoidectomy    PROSTATE SURGERY      REFRACTIVE SURGERY  05/10/2013    Corneal LASIK    WISDOM TOOTH EXTRACTION        Social History     Socioeconomic History    Marital status: Single     Spouse name: Not on file    Number of children: Not on file    Years of education: Not on file    Highest education level: Not on file   Occupational History    Not on file   Tobacco Use    Smoking status: Former     Packs/day: 1.50     Years: 27.00     Additional pack years: 0.00     Total pack years: 40.50     Types: Cigarettes     Quit date: 1999     Years since quittin.9    Smokeless tobacco: Never   Vaping Use    Vaping Use: Never used   Substance and Sexual Activity    Alcohol use: Never    Drug use: Never    Sexual activity: Defer   Other Topics Concern    Not on file   Social History Narrative    Not on file     Social Determinants of Health     Financial Resource Strain: Not on file   Food Insecurity: Not on file   Transportation Needs: Not on file   Physical Activity: Not on file   Stress: Not on file   Social Connections: Not on file   Intimate Partner Violence: Not on file   Housing Stability: Not on file     Past Medical History:   Diagnosis Date    BPH with obstruction/lower urinary tract symptoms 2023    s/p HoLEP removal - [facility] ([facility])  OFF finasteride. OFF afluzosin    Contact with and (suspected) exposure to covid-19     Exposure to COVID-19 virus    Fever, unspecified 01/15/2021    Low grade fever    Hypogonadism male 2023    Fatigue and low testosterone (300) in the past. Still low 303 spring 2016.   Discussed plusses and minuses of testosterone supplements.   May help ed and fatigue but may worsen bph, prostate cancer, high  red cells, and heart disease.     Pain in unspecified foot 02/23/2015    Heel pain    Personal history of other diseases of the digestive system     History of gastrointestinal hemorrhage    Personal history of other diseases of the respiratory system 01/22/2021    History of upper respiratory infection    Personal history of pneumonia (recurrent) 04/24/2019    History of community acquired pneumonia    Plantar fasciitis 11/06/2023    Shortness of breath     SOB (shortness of breath) on exertion    Trigger finger, unspecified finger 11/15/2016    Acquired trigger finger    Unspecified injury of thorax, initial encounter 06/20/2018    Rib injury    Unspecified injury of unspecified foot, initial encounter 02/23/2015    Injury of heel    Unspecified injury of unspecified wrist, hand and finger(s), initial encounter 04/14/2014    Wrist injury    Unspecified injury of unspecified wrist, hand and finger(s), initial encounter 04/24/2014    Hand injury      Family History   Problem Relation Name Age of Onset    Aneurysm Mother      Alzheimer's disease Father      Alcohol abuse Brother          Review of Systems   Constitutional:  Negative for chills, fatigue and fever.   HENT:  Negative for rhinorrhea and sore throat.    Eyes:  Negative for pain and redness.   Respiratory:  Negative for cough and shortness of breath.    Cardiovascular:  Negative for chest pain and palpitations.   Gastrointestinal:  Negative for abdominal pain and blood in stool.   Endocrine: Negative for polydipsia and polyuria.   Genitourinary:  Negative for dysuria and hematuria.   Musculoskeletal:  Negative for back pain and neck stiffness.   Skin:  Negative for rash and wound.   Allergic/Immunologic: Negative for environmental allergies and food allergies.   Neurological:  Negative for weakness and headaches.   Hematological:  Negative for adenopathy. Does not bruise/bleed easily.   Psychiatric/Behavioral:  Negative for hallucinations and suicidal  "ideas.       /76 (BP Location: Left arm, Patient Position: Sitting, BP Cuff Size: Adult)   Pulse 61   Temp 36.2 °C (97.2 °F) (Temporal)   Resp 16   Ht 1.753 m (5' 9\")   Wt 95.5 kg (210 lb 9.6 oz)   SpO2 96%   BMI 31.10 kg/m²   Physical Exam  Vitals reviewed.   Constitutional:       General: He is not in acute distress.     Appearance: He is not ill-appearing.   HENT:      Head: Normocephalic and atraumatic.      Right Ear: Tympanic membrane normal.      Left Ear: Tympanic membrane normal.      Nose: No congestion or rhinorrhea.      Mouth/Throat:      Pharynx: No oropharyngeal exudate or posterior oropharyngeal erythema.   Eyes:      Extraocular Movements: Extraocular movements intact.      Conjunctiva/sclera: Conjunctivae normal.      Pupils: Pupils are equal, round, and reactive to light.   Cardiovascular:      Rate and Rhythm: Normal rate and regular rhythm.      Heart sounds: No murmur heard.     No friction rub. No gallop.   Pulmonary:      Effort: Pulmonary effort is normal.      Breath sounds: Normal breath sounds. No wheezing, rhonchi or rales.   Abdominal:      General: There is no distension.      Palpations: Abdomen is soft.      Tenderness: There is no abdominal tenderness. There is no guarding or rebound.   Musculoskeletal:         General: No swelling or deformity.      Cervical back: Normal range of motion and neck supple.      Right lower leg: No edema.      Left lower leg: No edema.   Skin:     Capillary Refill: Capillary refill takes less than 2 seconds.      Coloration: Skin is not jaundiced.      Findings: No rash.   Neurological:      General: No focal deficit present.      Mental Status: He is alert.      Motor: No weakness.   Psychiatric:         Mood and Affect: Mood normal.         Behavior: Behavior normal.       Lab Results   Component Value Date    WBC 6.4 03/16/2023    HGB 11.8 (L) 03/16/2023    HCT 38.6 (L) 03/16/2023    MCV 67 (L) 03/16/2023     03/16/2023     Lab " "Results   Component Value Date    CHOL 174 12/09/2022    CHOL 178 12/10/2021    CHOL 178 12/09/2020     Lab Results   Component Value Date    HDL 49.0 12/09/2022    HDL 42.3 12/10/2021    HDL 44.5 12/09/2020     No results found for: \"LDLCALC\"  Lab Results   Component Value Date    TRIG 91 12/10/2021    TRIG 86 12/09/2020    TRIG 83 12/04/2019     No components found for: \"CHOLHDL\"  No results found for: \"HGBA1C\"    Assessment/Plan   Problem List Items Addressed This Visit       Erectile dysfunction - Primary    Overview     Viagra.         Medicare annual wellness visit, subsequent    Overview     12/22/23 Preventative exam -- Check labs.   For overweight/class 1 obesity -- weight loss tips provided. encourage regular exercise.    11/25/23 COLONOSCOPY ([facility]) Diverticula only. Repeat 10y in 2033.    11/30/23 incidental + 3 mm LLL nodule.          Other Visit Diagnoses       Class 1 obesity with body mass index (BMI) of 31.0 to 31.9 in adult, unspecified obesity type, unspecified whether serious comorbidity present        Never smoked cigarettes        Pain in right hip        Need for vaccination        Lung nodule                "

## 2023-12-23 DIAGNOSIS — E03.9 HYPOTHYROIDISM, UNSPECIFIED: ICD-10-CM

## 2023-12-23 LAB — PSA SERPL-MCNC: 0.17 NG/ML

## 2023-12-27 DIAGNOSIS — D64.9 ANEMIA, UNSPECIFIED TYPE: Primary | ICD-10-CM

## 2023-12-27 LAB
FERRITIN SERPL-MCNC: 115 NG/ML (ref 20–300)
IRON SATN MFR SERPL: 29 % (ref 25–45)
IRON SERPL-MCNC: 99 UG/DL (ref 35–150)
TIBC SERPL-MCNC: 344 UG/DL (ref 240–445)
UIBC SERPL-MCNC: 245 UG/DL (ref 110–370)

## 2023-12-27 RX ORDER — LEVOTHYROXINE SODIUM 75 UG/1
75 TABLET ORAL DAILY
Qty: 90 TABLET | Refills: 3 | Status: SHIPPED | OUTPATIENT
Start: 2023-12-27

## 2024-01-09 ENCOUNTER — OFFICE VISIT (OUTPATIENT)
Dept: UROLOGY | Facility: CLINIC | Age: 69
End: 2024-01-09
Payer: COMMERCIAL

## 2024-01-09 DIAGNOSIS — N40.1 BPH WITH OBSTRUCTION/LOWER URINARY TRACT SYMPTOMS: Primary | ICD-10-CM

## 2024-01-09 DIAGNOSIS — N13.8 BPH WITH OBSTRUCTION/LOWER URINARY TRACT SYMPTOMS: Primary | ICD-10-CM

## 2024-01-09 PROCEDURE — 1036F TOBACCO NON-USER: CPT | Performed by: UROLOGY

## 2024-01-09 PROCEDURE — 3008F BODY MASS INDEX DOCD: CPT | Performed by: UROLOGY

## 2024-01-09 PROCEDURE — 1126F AMNT PAIN NOTED NONE PRSNT: CPT | Performed by: UROLOGY

## 2024-01-09 PROCEDURE — 99213 OFFICE O/P EST LOW 20 MIN: CPT | Performed by: UROLOGY

## 2024-01-09 PROCEDURE — 1159F MED LIST DOCD IN RCRD: CPT | Performed by: UROLOGY

## 2024-01-09 PROCEDURE — 51798 US URINE CAPACITY MEASURE: CPT | Performed by: UROLOGY

## 2024-01-09 PROCEDURE — 51741 ELECTRO-UROFLOWMETRY FIRST: CPT | Performed by: UROLOGY

## 2024-01-09 NOTE — PROGRESS NOTES
Subjective   Wisam Olson is a 68 y.o. male with a history of BPH with obstruction/LUTS s/p HoLEP on 11/22/022. Presents today for a follow up visit. Patient is able to sleep through the night, his urinary stream is excellent. He is overall satisfied with the results of the HoLEP procedure. He denies flank pain, gross hematuria, kidney stones, and recurrent UTI.    Objective   Past Medical History:   Diagnosis Date    BPH with obstruction/lower urinary tract symptoms 11/06/2023    s/p HoLEP removal - [facility] ([facility])  OFF finasteride. OFF afluzosin    Contact with and (suspected) exposure to covid-19     Exposure to COVID-19 virus    Fever, unspecified 01/15/2021    Low grade fever    Hypogonadism male 11/06/2023    Fatigue and low testosterone (300) in the past. Still low 303 spring 2016.   Discussed plusses and minuses of testosterone supplements.   May help ed and fatigue but may worsen bph, prostate cancer, high red cells, and heart disease.     Pain in unspecified foot 02/23/2015    Heel pain    Personal history of other diseases of the digestive system     History of gastrointestinal hemorrhage    Personal history of other diseases of the respiratory system 01/22/2021    History of upper respiratory infection    Personal history of pneumonia (recurrent) 04/24/2019    History of community acquired pneumonia    Plantar fasciitis 11/06/2023    Shortness of breath     SOB (shortness of breath) on exertion    Trigger finger, unspecified finger 11/15/2016    Acquired trigger finger    Unspecified injury of thorax, initial encounter 06/20/2018    Rib injury    Unspecified injury of unspecified foot, initial encounter 02/23/2015    Injury of heel    Unspecified injury of unspecified wrist, hand and finger(s), initial encounter 04/14/2014    Wrist injury    Unspecified injury of unspecified wrist, hand and finger(s), initial encounter 04/24/2014    Hand injury     Past Surgical History:   Procedure Laterality Date     COLONOSCOPY  01/05/2015    Complete Colonoscopy    JOINT REPLACEMENT      OTHER SURGICAL HISTORY  10/07/2021    Tonsillectomy with adenoidectomy    PROSTATE SURGERY      REFRACTIVE SURGERY  05/10/2013    Corneal LASIK    WISDOM TOOTH EXTRACTION       Current Outpatient Medications on File Prior to Visit   Medication Sig Dispense Refill    levothyroxine (Synthroid, Levoxyl) 75 mcg tablet TAKE 1 TABLET BY MOUTH EVERY DAY 90 tablet 3    naproxen (Naprosyn) 500 mg tablet Take 1 tablet (500 mg) by mouth 2 times a day after meals. 90 tablet 3    sildenafil (Viagra) 100 mg tablet Take 1 tablet (100 mg) by mouth if needed for erectile dysfunction. 30 tablet 11     No current facility-administered medications on file prior to visit.     Allergies   Allergen Reactions    Codeine Other    Penicillins Rash     Tolerated amoxicillin       There were no vitals taken for this visit.  Physical Exam    Lab Review    Lab Results   Component Value Date    PSA 0.17 12/22/2023     IPSS 5 and 0  PVR 78ml   Uroflow  study demonstrated voided volume of 188 and maximal flow rate of 10ml/s.    Assessment/Plan   Diagnoses and all orders for this visit:  BPH with obstruction/lower urinary tract symptoms  -     Measure post void residual  -     Urine flow measurement    BPH with LUTS s/p HoLEP on 9/22/2022     Patient is overall satisfied with his urinary symptoms.     PSA is 0.17, stable. (12/22/2023)    Follow up as needed.     All questions were answered to the patient's satisfaction. Patient agrees with the plan and wishes to proceed. Follow-up will be scheduled appropriately.      Scribed for Dr. Ramirez by Lisa Sanchez. I , Dr Ramirez, have personally reviewed and agreed with the information entered by the Virtual Scribe.

## 2024-01-16 ENCOUNTER — TELEPHONE (OUTPATIENT)
Dept: SLEEP MEDICINE | Facility: CLINIC | Age: 69
End: 2024-01-16
Payer: COMMERCIAL

## 2024-01-26 NOTE — TELEPHONE ENCOUNTER
Called pt and he said that he is feeling better now but he liked his old machine settings. Pls ask pt to bring new machine and power cord on his upcoming appt so I can tweak settings

## 2024-02-01 ENCOUNTER — APPOINTMENT (OUTPATIENT)
Dept: SLEEP MEDICINE | Facility: CLINIC | Age: 69
End: 2024-02-01
Payer: COMMERCIAL

## 2024-04-04 ENCOUNTER — OFFICE VISIT (OUTPATIENT)
Dept: SLEEP MEDICINE | Facility: CLINIC | Age: 69
End: 2024-04-04
Payer: COMMERCIAL

## 2024-04-04 VITALS
WEIGHT: 215.8 LBS | BODY MASS INDEX: 31.96 KG/M2 | HEIGHT: 69 IN | DIASTOLIC BLOOD PRESSURE: 95 MMHG | SYSTOLIC BLOOD PRESSURE: 169 MMHG | OXYGEN SATURATION: 95 % | TEMPERATURE: 97.3 F | HEART RATE: 55 BPM | RESPIRATION RATE: 16 BRPM

## 2024-04-04 DIAGNOSIS — G47.33 OSA ON CPAP: Primary | ICD-10-CM

## 2024-04-04 DIAGNOSIS — R03.0 ELEVATED BLOOD PRESSURE READING WITHOUT DIAGNOSIS OF HYPERTENSION: ICD-10-CM

## 2024-04-04 PROCEDURE — 1159F MED LIST DOCD IN RCRD: CPT | Performed by: INTERNAL MEDICINE

## 2024-04-04 PROCEDURE — 1036F TOBACCO NON-USER: CPT | Performed by: INTERNAL MEDICINE

## 2024-04-04 PROCEDURE — 1160F RVW MEDS BY RX/DR IN RCRD: CPT | Performed by: INTERNAL MEDICINE

## 2024-04-04 PROCEDURE — 3008F BODY MASS INDEX DOCD: CPT | Performed by: INTERNAL MEDICINE

## 2024-04-04 PROCEDURE — 99214 OFFICE O/P EST MOD 30 MIN: CPT | Performed by: INTERNAL MEDICINE

## 2024-04-04 ASSESSMENT — SLEEP AND FATIGUE QUESTIONNAIRES
HOW LIKELY ARE YOU TO NOD OFF OR FALL ASLEEP WHILE SITTING AND READING: SLIGHT CHANCE OF DOZING
HOW LIKELY ARE YOU TO NOD OFF OR FALL ASLEEP WHILE SITTING QUIETLY AFTER LUNCH WITHOUT ALCOHOL: WOULD NEVER DOZE
ESS-CHAD TOTAL SCORE: 5
HOW LIKELY ARE YOU TO NOD OFF OR FALL ASLEEP WHILE WATCHING TV: SLIGHT CHANCE OF DOZING
HOW LIKELY ARE YOU TO NOD OFF OR FALL ASLEEP WHILE SITTING AND TALKING TO SOMEONE: WOULD NEVER DOZE
HOW LIKELY ARE YOU TO NOD OFF OR FALL ASLEEP IN A CAR, WHILE STOPPED FOR A FEW MINUTES IN TRAFFIC: WOULD NEVER DOZE
SITING INACTIVE IN A PUBLIC PLACE LIKE A CLASS ROOM OR A MOVIE THEATER: WOULD NEVER DOZE
HOW LIKELY ARE YOU TO NOD OFF OR FALL ASLEEP WHILE LYING DOWN TO REST IN THE AFTERNOON WHEN CIRCUMSTANCES PERMIT: MODERATE CHANCE OF DOZING
HOW LIKELY ARE YOU TO NOD OFF OR FALL ASLEEP WHEN YOU ARE A PASSENGER IN A CAR FOR AN HOUR WITHOUT A BREAK: SLIGHT CHANCE OF DOZING

## 2024-04-04 NOTE — PROGRESS NOTES
[facility]  FOLLOW-UP VISIT NOTE      PCP: Austin Bahena MD    HISTORY OF PRESENT ILLNESS     Patient ID: Wisam Olson is a 68 y.o. male who presents for follow-up of PHILLIP after new machine setup.     Patient is here alone today.  To review, patient's medical history is notable for obesity (BMI 32), hypothyroidism, sensorineural hearing loss, BPH, ED, and PHILLIP on CPAP.     Interval History  Patient was last seen in 11/7/2023. Plan was to start PAP therapy.  Since last visit, patient has been using machine every night. Current mask interface being used is Dreamwear nasal mask. Per records, patient is getting supplies thru [employer]  Patient denies machine problems, mask leak, air hunger, aerophagia, dry mouth, skin irritation, and nasal congestion.   Patient noted that the water in the water tank does not go down as much as his old machine. Also, patient denies dry mouth in the morning.   The following are patient's perceived benefits of PAP: no snoring on PAP, refreshing sleep, and decreased daytime sleepiness and/or fatigue.    RLS Follow-up:   Denies    SLEEP STUDY HISTORY (personally reviewed raw data such as interpretation report, data sheet, hypnogram, and titration table if available and applicable)  HSAT 5/14/2013: RDI 21.4, SpO2 brien 78%, time spent at SpO2<89% was 30 minutes  PAP titration 5/10/2018: CPAP was started at 4 to 13 cm H2O. Treatment emergent central apneas noted. No Cheyne-Bardales breathing noted. Overall AHI 17.8, central apnea index 8.7. At CPAP 13 cm H2O with REM supine sleep, AHI 31.4, SpO2 brien 86%. PLMS index 20.3.    SLEEP-WAKE SCHEDULE  Bedtime:  11:30 PM daily  Subjective sleep latency: 10 minutes  Difficulty falling asleep: No   Number of awakenings: none. Patient sleeps thru the night after the prostate surgery.  Nocturia: No  Difficulty staying asleep: No   Final wake time:  7:30 AM daily, except onWed and Fri, he plays hockey  "and has to wake up at 4:45 AM  Out of bed time: 7:30 AM daily, except onWed and Fri, he plays hockey and has to wake up at 4:45 AM  Shift work: No   Naps: no  Average sleep duration (excluding naps): 6-8 hours    SLEEP ENVIRONMENT  Sleep location: bed  Sleep status: sleeps with wife  Preferred sleep position: back and side    SOCIAL HISTORY  Smoking: no   ETOH: 4 drinks per week  Marijuana: no  Caffeine: 3-4 cups coffee daily  Sleep aids: no    WEIGHT: fluctuating    Claustrophobia: No     Active Problems, Allergy List, Medication List, and PMH/PSH/FH/Social Hx have been reviewed and reconciled in chart. No significant changes unless documented in the pertinent chart section. Updates made when necessary.     PHYSICAL EXAM     VITAL SIGNS: BP (!) 169/95   Pulse 55   Temp 36.3 °C (97.3 °F)   Resp 16   Ht 1.753 m (5' 9\")   Wt 97.9 kg (215 lb 12.8 oz)   SpO2 95%   BMI 31.87 kg/m²     NECK CIRCUMFERENCE: 17 inches    Today ESS: 5  Last visit ESS: 4  [place]: 2    Physical Exam  Constitutional: Awake, not in distress  Skin: Warm, no rash  Neuro: No tremors, moves all extremities  Psych: alert and oriented to time, place, and person    RESULTS/DATA     Iron (ug/dL)   Date Value   12/22/2023 99   07/25/2019 94     % Saturation (%)   Date Value   12/22/2023 29     TIBC (ug/dL)   Date Value   12/22/2023 344     Ferritin   Date Value   12/22/2023 115 ng/mL   07/25/2019 173 ug/L       Bicarbonate   Date Value Ref Range Status   12/22/2023 27 21 - 32 mmol/L Final       PAP Adherence  A PAP adherence data was obtained and reviewed personally today in clinic. (see scanned document in EPIC)      ASSESSMENT/PLAN     Wisam Olson is a 68 y.o. male who returns in [facility] for the following problems:    OBSTRUCTIVE SLEEP APNEA, MODERATE (HSAT AHI 21.4 )  SLEEP-RELATED HYPOXEMIA  - Now on auto-CPAP 8-16 cm H2O and EPR 3  - Doing well with improved PHILLIP symptoms.   - PAP adherence data reviewed " today. Usage days 30/30, days> 4 hours at 100%, residual AHI 4.4.   - Continue current machine settings. Continue using machine every night all night.   - Continue supportive management as follows: Lose weight. Stay off your back when sleeping. Avoid smoking, alcohol, and sedating medications if applicable. Don't drive when sleepy.    OBESITY   - BMI 31.87 today  - Recommend weight loss with diet and exercise.  - Weight loss can help in long term management of PHILLIP.  - Defer management to PCP    ELEVATED BLOOD PRESSURE READING without diagnosis of HYPERTENSION  - Initial BP reading was high today.   - Advised patient to monitor BP once daily. Call PCP if BP is persistently high (ie more than 140/90 mm Hg)  - Discussed with patient about symptoms of target organ damage, FAST acronym for stroke, and how to monitor BP properly at home.   - Reemphasized to patient when to go to ER and the need to call PCP if BP is persistently high.  - Supportive management: low salt DASH diet (less than 2000 mg sodium intake daily), moderate intensity aerobic exercise at least 30 minutes 5 days per week, reduce stress, quit smoking, limit alcohol, and lose weight.      All of patient's questions were answered. He verbalizes understanding and agreement with my assessment and plan. Refer to after-visit-summary (AVS) for patient education and if applicable, for more details on sleep study preparation, troubleshooting issues with PAP usage, proper cleaning instructions of PAP supplies, and usual recommended replacement schedule for each of the PAP supplies.     Follow-up in 1 year.

## 2024-04-04 NOTE — LETTER
July 18, 2024    To whom this may concern,    This letter certifies that patient was diagnosed with moderate PHILLIP in 2013 and has been using CPAP every night since then. He is currently on auto-CPAP 8-16 cm H2O and EPR 3 with 100% compliance, average 7.5 hours usage per night, and residual AHI of 4.4       Sincerely yours      Sharmaine Morocho MD  Sleep Medicine Physician

## 2024-04-04 NOTE — PATIENT INSTRUCTIONS
"Thank you for coming to the Sleep Medicine Clinic today! Your sleep medicine provider today was: Sharmaine Morocho MD Below is a summary of your treatment plan, patient education, other important information, and our contact numbers.      TREATMENT PLAN     - Continue current machine settings. Continue using machine every night all night.   - Please monitor BP once daily and call PCP if persistently high (more than 140/90)  - Please read the \"Patient Education\" section below for more detailed information. Try implementing tips, reminders, strategies, and supportive management.   - If not yet done, please sign up for [facility] to make a future schedule, send prescription requests, or send messages.    Follow-up Appointment:   Follow-up in 1 year.    PATIENT EDUCATION     Obstructive Sleep Apnea (PHILLIP) is a sleep disorder where your upper airway muscles relax during sleep and the airway intermittently and repetitively narrows and collapses leading to partially blocked airway (hypopnea) or completely blocked airway (apnea) which, in turn, can disrupt breathing in sleep, lower oxygen levels while you sleep and cause night time wakings. Because both apnea and hypopnea may cause higher carbon dioxide or low oxygen levels, untreated PHILLIP can lead to heart arrhythmia, elevation of blood pressure, and make it harder for the body to consolidate memory and facilitate metabolism (leading to higher blood sugars at night). Frequent partial arousals occur during sleep resulting in sleep deprivation and daytime sleepiness. PHILLIP is associated with an increased risk of cardiovascular disease, stroke, hypertension, and insulin resistance. Moreover, untreated PHILLIP with excessive daytime sleepiness can increase the risk of motor vehicular accidents.    Some conservative strategies for PHILLIP regardless of PHILLIP severity are:   Positional therapy - Avoid sleeping on your back.   Healthy diet and regular exercise to optimize weight is highly " encouraged.   Avoid alcohol late in the evening and sedative-hypnotics as these substances can make sleep apnea worse.   Improve breathing through the nose with intranasal steroid spray, saline rinse, or antihistamines    Safety: Avoid driving vehicle and operating heavy equipment while sleepy. Drowsy driving may lead to life-threatening motor vehicle accidents. A person driving while sleepy is 5 times more likely to have an accident. If you feel sleepy, pull over and take a short power nap (sleep for less than 30 minutes). Otherwise, ask somebody to drive you.    Treatment options for sleep apnea include weight management, positional therapy, Positive Airway Therapy (PAP) therapy, oral appliance therapy, hypoglossal nerve stimulator (Inspire) and select airway surgeries.    Annual Reminders About Your Sleep Apnea    Your sleep apnea is well controlled based on reviewing your PAP Data Report.     General Reminders:  Continue current machine settings. Continue using machine every night. Need at least 4 hours daily usage.   Remember to clean your mask, tubings, and water chamber regularly as instructed.   Know the replacement schedule of your supplies/ accessories and contact your DME (durable medical equipment) provider if you are due for them.   Avoid driving or operating heavy machinery when drowsy. A person driving while sleepy is 5 times more likely to have an accident. If you feel sleepy, pull over and take a short power nap (sleep for less than 30 minutes). Otherwise, ask somebody to drive you.    Follow-up sooner through [facility] or calling our office if you have any of the following symptoms:  Snoring or stopping breathing while using the machine  Recurrence of fatigue, sleepiness, insomnia, and other symptoms you had prior using machine  Persistent or worsening fatigue or sleepiness despite regular use of machine  Issues tolerating the machine like bloating sensation, air hunger, too much hot air, too much  pressure, taking off mask without recall in the middle of sleep, etc.     Other conservative measures to improve sleep apnea:  Losing weight can lead to some improvement of PHILLIP which means you will need lower pressures in machine to control your PHILLIP. In some patients, they don't need to use the machine after bariatric surgery. Hence, consider medical and/or surgical weight loss especially if your BMI is more than 35.  Avoiding alcohol, sedative-hypnotics, and sedating medications is imperative as these substances can worsen snoring and sleep apnea  If you have nasal congestion or seasonal allergies, improving your breathing through the nose is critical for treating sleep apnea, tolerating CPAP, and improving your sleep; hence, using intranasal steroid spray like Flonase might help. Make sure you know the proper way to use it.  Stay off your back when sleeping.    Common issues with PAP machine:  Mask gets dislodged when turning to the side: Consider getting a CPAP pillow or switching to a mask with hose on top.     Dry mouth:  Your machine has built-in humidifier that heats up the air to prevent dry mouth. It can be adjusted to your comfort. You can try that first and increase setting one level one night at a time to check which setting is comfortable and effective in lessening dry mouth. In some patients with heated hose, adjusting tube temperature to make air warmer can improve dry mouth. If dry mouth persists despite adjusting humidity or tube temperature setting, may apply OTC Biotene gel over the gums at bedtime.  If Biotene gel is not effective, consider trying XEROSTOM gel from [employer].  Also, eliminate or reduce dose of medications that can cause dry mouth if possible. Lastly, may try getting a separate room humidifier machine.    Airleaks: Please call DME as they may need to adjust your mask or refit you with a different kind or different size of mask. In addition, you can ask DME for tips on getting a  "good mask seal and mask fit.     Difficulty tolerating the mask: Contact your DME to try a different kind of mask and/or call office to get a referral to Sleep Psychologist for CPAP desensitization. CPAP desensitization technique is a set of strategies that helps patient cope with claustrophobia and anxiety related to wearing mask. Alternatively, we can do a daytime mini-sleep study called PAP-nap trial wherein you will try on different kinds of mask and the sleep technician will try different pressure settings on CPAP and BPAP machines to see which specific pressure is tolerable and comfortable for you.     Water droplets or moisture within the hose and/or mask: This is called rain-out and it is caused by condensation of too much heated humidity on the cooler walls of the hose. If you have rain-out, turn down humidity settings or get a heated hose. If you already have a heated hose, turn up the \"tube temperature\" of the heated hose. Alternatively, if you don't want to get a heated hose or warmer air, may wrap the CPAP hose with stockings to keep it somewhat warm. Also, you need to place the machine on the floor and lower the hose so that water won't travel upward towards your mask.     Maintaining your CPAP/BPAP device:    The humidification chamber (aka water tank or water chamber) needs to be filled with distilled water to prevent buildup of white deposits in the future. If you cannot find distilled water, you can use tap water but expect to have white deposits buildup seen after prolonged use with tap water. If you start seeing white deposits on the water chamber, you can clean it by filling it with equal parts of distilled white vinegar and water. Let the vinegar-water mixture sit for 2 hours, and then rinse it with running tap water. Clean with soap and water then let it dry.     You should try to keep your machine clean in order to work well. Here are some tips to clean PAP supplies / accessories:    Clean " the humidification chamber (aka water tank) as well as your mask and tubing at least once a week with soap and water.   Alternatively, you can fill a sink or basin with warm water and add a little mild detergent, like Ivory dish soap. Gently wipe your supplies with the soapy water to free all the oils and dirt that may have collected. Once that's done, rinse these items with clean water until the soap is gone and let them air dry. You can hang your tubing over the curtain marialuisa in your bathroom so that it dries.  The mask insert (part of the mask that has contact with your skin) needs to be cleaned with soap and water daily. Another option is to wipe them down with CPAP wipes or baby wipes.    You should replace your mask and tubing frequently in order to prevent bacteria buildup, machine damage, and mask seal issues. The older the mask and hose, the high likelihood that there is bacteria buildup in it especially if they are not cleaned regularly. Dirty filters damage machines because build-up of dust and contaminants can cause machine to over-heat, and in time, damage the motor of machine. Cushions lose their seal over time as most masks are made of plastic and silicone while headgear is made of neoprene. These materials will break down with age and frequent use. Here is the recommended replacement schedule for PAP supplies / accessories:    Twice a month- disposable filters and cushions for nasal mask or nasal pillows.  Once a month- cushion for full face mask  Every 3 months- mask with headgear and PAP tubing (standard or heated hose)  Every 6 months- reusable filter, water chamber, and chin strap     Other useful information:    Some people do not put water in the tank while other people prefers to put water in the tank to prevent mouth dryness. Try to experiment to determine which is more comfortable for you.   In general, new machines have 2 years warranty on parts while health insurance allows you to have a new  machine once every 5 years.     You can also go to the following EDUCATION WEBSITES for further information:   American Academy of Sleep Medicine http://sleepeducation.org  National Sleep Foundation: https://sleepfoundation.org  American Sleep Apnea Association: https://www.sleepapnea.org (for patients with sleep apnea)  Narcolepsy Network: https://www.narcolepsynetwork.org (for patients with narcolepsy)  [employer]le[employer]: https://www.dooyoocolepsy.org (for patients with narcolepsy)  Hypersomnia Foundation: https://www.hypersomniafoundation.org (for patients with idiopathic hypersomnia)  RLS foundation: https://www.rls.org (for patients with restless leg syndrome)    IMPORTANT INFORMATION     Call 911 for medical emergencies.  Our offices are generally open from Monday-Friday, 8 am - 5 pm.   There are no supporting services by either the sleep doctors or their staff on weekends and Holidays, or after 5 PM on weekdays.   If you need to get in touch with me, you may either call my office number or you can use [employer]Tree.  If a referral for a test, for CPAP, or for another specialist was made, and you have not heard about scheduling this within a week, please call scheduling at [phone] ([phone]).  If you are unable to make your appointment for clinic or an overnight study, kindly call the office or sleep testing center at least 48 hours in advance to cancel and reschedule.  If you are on CPAP, please bring your device's card and/or the device to each clinic appointment.   In case of problems with PAP machine or mask interface, please contact your [employer] (Durable Medical Equipment) company first. [employer] is the company who provides you the machine and/or PAP supplies.       PRESCRIPTIONS     We require 7 days advanced notice for prescription refills. If we do not receive the request in this time, we cannot guarantee that your medication will be refilled in time.    IMPORTANT PHONE NUMBERS     Sleep Medicine Clinic Fax:  [phone]  Appointments (for Pediatric Sleep Clinic): [phone] - option 1  Appointments (for Adult Sleep Clinic): [phone] - option 2  Appointments (For Sleep Studies): [phone] - option 3  Behavioral Sleep Medicine: [phone]  Sleep Surgery: [phone]  Nutrition Service: [phone]  Weight management clinics with endocrinology: [phone]  Bariatric Services: [phone] (includes weight loss medications and weight loss surgery)  [facility]: [phone] (offers holistic approaches to weight management)  ENT (Otolaryngology): [phone]  Headache Clinic (Neurology): [phone]  Neurology: [phone]  Psychiatry: [phone]  Pulmonary Function Testing (PFT) Center: [phone]  Pulmonary Medicine: [phone]  [facility]): [phone]      OUR SLEEP TESTING LOCATIONS     Our team will contact you to schedule your sleep study, however, you can contact us as follow:  Main Phone Line (scheduling only): [phone]), option 3  Adult and Pediatric Locations  [facility] (6 years and older): [facility] - 4th floor ([address] [place] [address]) After hours line: [phone]  [facility] (Main campus: All ages): [facility], 6th floor. After hours line: [phone]   [place] (5 years and older; younger considered on case-by-case basis): [address]; [address], [address]. Scheduling  After hours line: [phone]   [place] (6 years and older): [address]; [address]; [address]   [place] (6 years and older): [address] [facility], [address]  After hours line: [phone]   [place] (13 years and older) in [place]: [address], 2nd floor  After hours line: [phone]   [place] (13 year and older): [address] [facility] [address]  After hours line: [phone]     Adult Only Locations:  [place] (18 years and  "older): [address] [facility], 2nd floor   [place] (18 years and older): [address] [facility]; 4th floor  After hours line: [phone]   [place] (18 years and older) at [place]: [address] [facility]  After hours line: [phone]     CONTACTING YOUR SLEEP MEDICINE PROVIDER AND SLEEP TEAM      For issues with your machine or mask interface, please call your DME provider first. DME stands for durable medical company. [employer] is the company who provides you the machine and/or PAP supplies / accessories.   To schedule, cancel, or reschedule SLEEP STUDY APPOINTMENTS, please call the Main Phone Line at [phone] - option 3.   To schedule, cancel, or reschedule CLINIC APPOINTMENTS, you can do it in \"[employer]Book[facility]art\", call [phone] to speak with my  (Cassandra Cavazos), or call the Main Phone Line at [phone] - option 2  For CLINICAL QUESTIONS or MEDICATION REFILLS, please call direct line for Adult Sleep Nurses at [phone].   Lastly, you can also send a message directly to your provider through \"My Chart\", which is the email service through your  Records Account: [email or web address]       Here at [facility], we wish you a restful sleep!    "

## 2024-04-12 ENCOUNTER — TELEPHONE (OUTPATIENT)
Dept: PRIMARY CARE | Facility: CLINIC | Age: 69
End: 2024-04-12
Payer: COMMERCIAL

## 2024-04-12 NOTE — TELEPHONE ENCOUNTER
"Pt called stating he has concerns about high bp that's been going on for 5 days. I informed the pt I didn't have anything for Shruti but could get him in with another provider. Pt stated \"what's the point of having a pcp if I can't see him\" I informed the pt that is something he will have talk to pcp about if he's not happy with his schedule.     I offered to schedule pt again with another provider and send a message to MA that could possibly get him in sooner if they believed he should be seen by Shruti. Pt asked for Shruti to personally call him. I informed the pt that I can't promise he will be giving the call but I will send the message in pt yells \" No, I'm telling you I want the Shruti to call me!\"     I informed the pt that yelling at me won't get him anywhere, told him I will do like I said and send the message to the nurse, I said goodbye and ended the conversation.    "

## 2024-04-17 ENCOUNTER — OFFICE VISIT (OUTPATIENT)
Dept: PRIMARY CARE | Facility: CLINIC | Age: 69
End: 2024-04-17
Payer: COMMERCIAL

## 2024-04-17 VITALS
TEMPERATURE: 97.7 F | WEIGHT: 216 LBS | DIASTOLIC BLOOD PRESSURE: 80 MMHG | HEART RATE: 74 BPM | BODY MASS INDEX: 31.9 KG/M2 | OXYGEN SATURATION: 95 % | SYSTOLIC BLOOD PRESSURE: 152 MMHG | RESPIRATION RATE: 16 BRPM

## 2024-04-17 DIAGNOSIS — M15.9 PRIMARY OSTEOARTHRITIS INVOLVING MULTIPLE JOINTS: ICD-10-CM

## 2024-04-17 DIAGNOSIS — J01.10 ACUTE NON-RECURRENT FRONTAL SINUSITIS: Primary | ICD-10-CM

## 2024-04-17 DIAGNOSIS — R03.0 ELEVATED BP WITHOUT DIAGNOSIS OF HYPERTENSION: ICD-10-CM

## 2024-04-17 PROCEDURE — 1159F MED LIST DOCD IN RCRD: CPT | Performed by: FAMILY MEDICINE

## 2024-04-17 PROCEDURE — 1160F RVW MEDS BY RX/DR IN RCRD: CPT | Performed by: FAMILY MEDICINE

## 2024-04-17 PROCEDURE — 99214 OFFICE O/P EST MOD 30 MIN: CPT | Performed by: FAMILY MEDICINE

## 2024-04-17 PROCEDURE — 3008F BODY MASS INDEX DOCD: CPT | Performed by: FAMILY MEDICINE

## 2024-04-17 RX ORDER — METHOCARBAMOL 750 MG/1
750 TABLET, FILM COATED ORAL NIGHTLY PRN
Qty: 30 TABLET | Refills: 11 | Status: SHIPPED | OUTPATIENT
Start: 2024-04-17 | End: 2025-04-17

## 2024-04-17 RX ORDER — DICLOFENAC SODIUM 10 MG/G
2-4 GEL TOPICAL 4 TIMES DAILY PRN
Qty: 200 G | Refills: 11 | Status: SHIPPED | OUTPATIENT
Start: 2024-04-17

## 2024-04-17 RX ORDER — AZITHROMYCIN 250 MG/1
TABLET, FILM COATED ORAL DAILY
Qty: 6 TABLET | Refills: 0 | Status: SHIPPED | OUTPATIENT
Start: 2024-04-17 | End: 2024-04-22

## 2024-04-17 ASSESSMENT — ENCOUNTER SYMPTOMS
PND: 0
SWEATS: 0
HEADACHES: 0
PALPITATIONS: 0
SHORTNESS OF BREATH: 0
NECK PAIN: 0
BLURRED VISION: 0
HYPERTENSION: 1
ORTHOPNEA: 0

## 2024-04-17 NOTE — PATIENT INSTRUCTIONS
Continue to check BP twice a day.   Stop NSAIDS (ibuprofen and naproxyn)  Try tylenol 500 mg 2 tablets up to 3 times a day  Voltaren gel as needed  Muscle relaxer in evening as needed.     If no improvement in 2 weeks will check bloodwork and start a bp med.

## 2024-04-17 NOTE — ASSESSMENT & PLAN NOTE
12/22/23 Preventative exam -- Check labs.   For overweight/class 1 obesity -- weight loss tips provided. encourage regular exercise.    11/25/23 COLONOSCOPY ([facility]) Diverticula only. Repeat 10y in 2033.    11/30/23 CACS 5, + incidental 3 mm LLL nodule.   12/2023 ESQUEDA 3.6%

## 2024-04-17 NOTE — ASSESSMENT & PLAN NOTE
Discontinue oral NSAIDS.  Will use tylenol 500 mg up to 6/day  Topical voltaren gel  Muscle relaxer to control pain at night.   If no improvement then can consider starting BP med (chlorthalidone vs ACEi vs CCB)

## 2024-04-17 NOTE — PROGRESS NOTES
"Subjective   Patient ID: Wisam Olson is a 68 y.o. male who presents for Hypertension.    New onset HTN regularly above 140/90 in the past few weeks. Up 4/4/24 at Sleep med appt.      No CP/SOB/N/V/D.  + Sinus congestion, pain.   Increased stress -- recently layed off.  Looking for work to keep busy.     Has been taking 800 mg ibuprofen at night and naproxen during day for aches and pains.     Objective   Visit Vitals  /80 (BP Location: Left arm, Patient Position: Sitting, BP Cuff Size: Adult)   Pulse 74   Temp 36.5 °C (97.7 °F) (Temporal)   Resp 16   Wt 98 kg (216 lb)   SpO2 95%   BMI 31.90 kg/m²   Smoking Status Former   BSA 2.18 m²      O: VSS AFEB Awake, Alert, NAD.  No intoxication, withdrawal, or sedation.  Chest CTA.  Heart RRR.  Ext no c/c/e.      Lab Results   Component Value Date    WBC 6.4 12/22/2023    HGB 11.9 (L) 12/22/2023    HCT 38.3 (L) 12/22/2023    MCV 68 (L) 12/22/2023     12/22/2023       Chemistry    Lab Results   Component Value Date/Time     12/22/2023 1136    K 4.8 12/22/2023 1136     12/22/2023 1136    CO2 27 12/22/2023 1136    BUN 26 (H) 12/22/2023 1136    CREATININE 1.13 12/22/2023 1136    Lab Results   Component Value Date/Time    CALCIUM 9.3 12/22/2023 1136    ALKPHOS 51 12/22/2023 1136    AST 17 12/22/2023 1136    ALT 16 12/22/2023 1136    BILITOT 0.8 12/22/2023 1136          Lab Results   Component Value Date    CHOL 193 12/22/2023    CHOL 174 12/09/2022    CHOL 178 12/10/2021     Lab Results   Component Value Date    HDL 48.1 12/22/2023    HDL 49.0 12/09/2022    HDL 42.3 12/10/2021     Lab Results   Component Value Date    LDLCALC 124 (H) 12/22/2023     Lab Results   Component Value Date    TRIG 104 12/22/2023    TRIG 91 12/10/2021    TRIG 86 12/09/2020     No components found for: \"CHOLHDL\"   No results found for: \"HGBA1C\"    Assessment/Plan   Problem List Items Addressed This Visit       Osteoarthritis (arthritis due to wear and tear of joints)    " Overview     Right elbow.  Non-surgical.         Relevant Medications    methocarbamol (Robaxin) 750 mg tablet    Elevated BP without diagnosis of hypertension    Overview     4/2024 Likely due to NSAID use.          Current Assessment & Plan     Discontinue oral NSAIDS.  Will use tylenol 500 mg up to 6/day  Topical voltaren gel  Muscle relaxer to control pain at night.   If no improvement then can consider starting BP med (chlorthalidone vs ACEi vs CCB)         Relevant Orders    Basic metabolic panel     Other Visit Diagnoses       Acute non-recurrent frontal sinusitis    -  Primary    Relevant Medications    azithromycin (Zithromax) 250 mg tablet    diclofenac sodium (Voltaren) 1 % gel             Answers submitted by the patient for this visit:  High Blood Pressure Questionnaire (Submitted on 4/17/2024)  Chief Complaint: Hypertension  Chronicity: new  Onset: today  Progression since onset: waxing and waning  Condition status: resistant  anxiety: No  blurred vision: No  chest pain: No  headaches: No  malaise/fatigue: No  neck pain: No  orthopnea: No  palpitations: No  peripheral edema: No  PND: No  shortness of breath: No  sweats: No  Agents associated with hypertension: NSAIDs, thyroid hormones  CAD risks: obesity  Compliance problems: no compliance problems

## 2024-07-15 ENCOUNTER — TELEPHONE (OUTPATIENT)
Dept: SLEEP MEDICINE | Facility: CLINIC | Age: 69
End: 2024-07-15
Payer: COMMERCIAL

## 2024-07-15 DIAGNOSIS — G47.33 OSA ON CPAP: Primary | ICD-10-CM

## 2024-07-15 NOTE — LETTER
[facility] Sleep Medicine  [address]  [facility]  [address]  [facility] [address]     DATE: 7/23/2024     PATIENT NAME: Wisam Olson   YOB: 1955  MRN: [id number]    To Whom This May Concern,    Wisam Olson is currently my patient being seen for Obstructive sleep apnea which was diagnosed in 2013. He was last seen in my [place] office on 4/4/2024.     He has been using his CPAP machine with excellent compliance and continues to benefit from it.     If you have any questions, please feel free to contact my office at [phone].      Sincerely yours,       Sharmaine Morocho MD  [facility] Board Certified in Sleep Medicine

## 2024-07-15 NOTE — TELEPHONE ENCOUNTER
Patient has switched to Medicare and he needs a new supply order sent to [facility] dated after 06/01/24.  He also needs an updated note showing his usage for Medicare patient just saw you in April.  He needs this done by 07/19/24.

## 2024-08-06 ENCOUNTER — OFFICE VISIT (OUTPATIENT)
Dept: ORTHOPEDIC SURGERY | Facility: CLINIC | Age: 69
End: 2024-08-06
Payer: MEDICARE

## 2024-08-06 ENCOUNTER — HOSPITAL ENCOUNTER (OUTPATIENT)
Dept: RADIOLOGY | Facility: HOSPITAL | Age: 69
Discharge: HOME | End: 2024-08-06
Payer: MEDICARE

## 2024-08-06 DIAGNOSIS — M25.551 RIGHT HIP PAIN: Primary | ICD-10-CM

## 2024-08-06 DIAGNOSIS — M25.551 RIGHT HIP PAIN: ICD-10-CM

## 2024-08-06 PROCEDURE — 1160F RVW MEDS BY RX/DR IN RCRD: CPT

## 2024-08-06 PROCEDURE — 1036F TOBACCO NON-USER: CPT

## 2024-08-06 PROCEDURE — 1125F AMNT PAIN NOTED PAIN PRSNT: CPT

## 2024-08-06 PROCEDURE — 99214 OFFICE O/P EST MOD 30 MIN: CPT

## 2024-08-06 PROCEDURE — 1159F MED LIST DOCD IN RCRD: CPT

## 2024-08-06 PROCEDURE — 73502 X-RAY EXAM HIP UNI 2-3 VIEWS: CPT | Mod: RT

## 2024-08-06 ASSESSMENT — PAIN - FUNCTIONAL ASSESSMENT: PAIN_FUNCTIONAL_ASSESSMENT: 0-10

## 2024-08-06 ASSESSMENT — PAIN SCALES - GENERAL: PAINLEVEL_OUTOF10: 6

## 2024-08-06 NOTE — PROGRESS NOTES
This is a consultation from Dr. Austin Bahena MD for   Chief Complaint   Patient presents with    Right Hip - Pain     rt hip pain, 3/20/23 rt marianela       This is a 68 y.o. male who presents for evaluation of right hip pain.  Patient has history of right hip arthroplasty within the year and states that he has been having pain in his right lower back and radiation to his right thigh.  He is very active with pickleball and hockey.  He states that he has a hard time getting up from a seated position and experiences pain.  Patient denies groin pain or point tenderness pain over the right lateral thigh.  Patient states he has looked up exercises online to do for her lower back and says that it has helped a little bit.    Physical Exam    There has been no interval change in this patient's past medical, surgical, medications, allergies, family history or social history since the most recent visit to a provider within our department. 14 point review of systems was performed, reviewed, and negative except for pertinent positives documented in the history of present illness.     Constitutional: well developed, well nourished male in no acute distress  Psychiatric: normal mood, appropriate affect  Eyes: sclera anicteric  HENT: normocephalic/atraumatic  CV: regular rate and rhythm   Respiratory: non labored breathing  Integumentary: no rash  Neurological: moves all extremities    Right hip exam: skin normal, no abrasions, wounds, or lacerations. nttp over greater trochanter. negative log roll, negative cesia's test. flexion to 90 degrees without pain. no pain with flexion abduction and external rotation, no pain with flexion adduction and internal rotation. neurovascularly intact distally        Xrays were ordered by me, they were reviewed and independently interpreted by me today, they show stable implants in the right hip with no presence of loosening or fractures around the implant  "site.    Procedures      Impression/Plan: This is a 68 y.o. male for evaluation of right hip or low back pain.  We did an in-depth discussion that his right hip is more than likely not the issue and is more due to his lower back.  He stated that he is doing well finding exercises that will help with his back and I also gave a few other ideas of stretches to perform.  I recommended that he can take some anti-inflammatory medications as needed for this pain.  I stated that if the pain progresses that he should see a back specialist for further evaluation.    BMI Readings from Last 1 Encounters:   04/17/24 31.90 kg/m²      Lab Results   Component Value Date    CREATININE 1.13 12/22/2023     Tobacco Use: Medium Risk (8/6/2024)    Patient History     Smoking Tobacco Use: Former     Smokeless Tobacco Use: Never     Passive Exposure: Not on file      Computed MELD 3.0 unavailable. One or more values for this score either were not found within the given timeframe or did not fit some other criterion.  Computed MELD-Na unavailable. One or more values for this score either were not found within the given timeframe or did not fit some other criterion.       No results found for: \"HGBA1C\"  Lab Results   Component Value Date    STAPHMRSASCR NO Staphylococcus aureus ISOLATED. 03/16/2023     "

## 2024-12-11 ENCOUNTER — APPOINTMENT (OUTPATIENT)
Dept: PRIMARY CARE | Facility: CLINIC | Age: 69
End: 2024-12-11
Payer: COMMERCIAL

## 2024-12-11 VITALS
HEART RATE: 57 BPM | TEMPERATURE: 97.5 F | WEIGHT: 211 LBS | DIASTOLIC BLOOD PRESSURE: 74 MMHG | BODY MASS INDEX: 31.25 KG/M2 | HEIGHT: 69 IN | SYSTOLIC BLOOD PRESSURE: 130 MMHG | OXYGEN SATURATION: 98 %

## 2024-12-11 DIAGNOSIS — Z99.89 CPAP (CONTINUOUS POSITIVE AIRWAY PRESSURE) DEPENDENCE: ICD-10-CM

## 2024-12-11 DIAGNOSIS — Z00.00 MEDICARE ANNUAL WELLNESS VISIT, SUBSEQUENT: Primary | ICD-10-CM

## 2024-12-11 DIAGNOSIS — E03.9 HYPOTHYROIDISM, UNSPECIFIED: ICD-10-CM

## 2024-12-11 DIAGNOSIS — M15.0 PRIMARY OSTEOARTHRITIS INVOLVING MULTIPLE JOINTS: ICD-10-CM

## 2024-12-11 DIAGNOSIS — N52.39 POST-PROCEDURAL ERECTILE DYSFUNCTION, UNSPECIFIED TYPE: ICD-10-CM

## 2024-12-11 DIAGNOSIS — G47.33 OBSTRUCTIVE SLEEP APNEA: ICD-10-CM

## 2024-12-11 DIAGNOSIS — M54.42 ACUTE MIDLINE LOW BACK PAIN WITH BILATERAL SCIATICA: ICD-10-CM

## 2024-12-11 DIAGNOSIS — E03.9 ACQUIRED HYPOTHYROIDISM: ICD-10-CM

## 2024-12-11 DIAGNOSIS — E78.49 OTHER HYPERLIPIDEMIA: ICD-10-CM

## 2024-12-11 DIAGNOSIS — N40.0 BENIGN PROSTATIC HYPERPLASIA WITHOUT LOWER URINARY TRACT SYMPTOMS: ICD-10-CM

## 2024-12-11 DIAGNOSIS — Z12.5 SCREENING FOR PROSTATE CANCER: ICD-10-CM

## 2024-12-11 DIAGNOSIS — R91.1 LUNG NODULE: ICD-10-CM

## 2024-12-11 DIAGNOSIS — M54.41 ACUTE MIDLINE LOW BACK PAIN WITH BILATERAL SCIATICA: ICD-10-CM

## 2024-12-11 DIAGNOSIS — R03.0 ELEVATED BP WITHOUT DIAGNOSIS OF HYPERTENSION: ICD-10-CM

## 2024-12-11 PROCEDURE — 99397 PER PM REEVAL EST PAT 65+ YR: CPT | Performed by: FAMILY MEDICINE

## 2024-12-11 PROCEDURE — 3008F BODY MASS INDEX DOCD: CPT | Performed by: FAMILY MEDICINE

## 2024-12-11 PROCEDURE — 1159F MED LIST DOCD IN RCRD: CPT | Performed by: FAMILY MEDICINE

## 2024-12-11 PROCEDURE — 1036F TOBACCO NON-USER: CPT | Performed by: FAMILY MEDICINE

## 2024-12-11 PROCEDURE — 1160F RVW MEDS BY RX/DR IN RCRD: CPT | Performed by: FAMILY MEDICINE

## 2024-12-11 RX ORDER — METHOCARBAMOL 750 MG/1
750-1500 TABLET, FILM COATED ORAL 4 TIMES DAILY PRN
Qty: 240 TABLET | Refills: 3 | Status: SHIPPED | OUTPATIENT
Start: 2024-12-11

## 2024-12-11 RX ORDER — LOSARTAN POTASSIUM 50 MG/1
50 TABLET ORAL DAILY
Qty: 100 TABLET | Refills: 3 | Status: SHIPPED | OUTPATIENT
Start: 2024-12-11 | End: 2026-01-15

## 2024-12-11 RX ORDER — HYDROCODONE BITARTRATE AND ACETAMINOPHEN 5; 325 MG/1; MG/1
1-2 TABLET ORAL EVERY 6 HOURS PRN
Qty: 56 TABLET | Refills: 0 | Status: SHIPPED | OUTPATIENT
Start: 2024-12-11 | End: 2024-12-18

## 2024-12-11 RX ORDER — TADALAFIL 20 MG/1
20 TABLET ORAL DAILY PRN
Qty: 30 TABLET | Refills: 11 | Status: SHIPPED | OUTPATIENT
Start: 2024-12-11 | End: 2025-12-11

## 2024-12-11 RX ORDER — LEVOTHYROXINE SODIUM 75 UG/1
75 TABLET ORAL DAILY
Qty: 90 TABLET | Refills: 3 | Status: SHIPPED | OUTPATIENT
Start: 2024-12-11

## 2024-12-11 ASSESSMENT — ENCOUNTER SYMPTOMS
NECK STIFFNESS: 0
ABDOMINAL PAIN: 0
COUGH: 0
POLYDIPSIA: 0
BRUISES/BLEEDS EASILY: 0
DYSURIA: 0
SORE THROAT: 0
EYE REDNESS: 0
PALPITATIONS: 0
RHINORRHEA: 0
CHILLS: 0
BLOOD IN STOOL: 0
WEAKNESS: 0
ADENOPATHY: 0
HEADACHES: 0
SHORTNESS OF BREATH: 0
HEMATURIA: 0
BACK PAIN: 1
FEVER: 0
WOUND: 0
HALLUCINATIONS: 0
EYE PAIN: 0
FATIGUE: 0

## 2024-12-11 ASSESSMENT — PROMIS GLOBAL HEALTH SCALE
RATE_QUALITY_OF_LIFE: VERY GOOD
RATE_AVERAGE_PAIN: 4
CARRYOUT_PHYSICAL_ACTIVITIES: COMPLETELY
RATE_PHYSICAL_HEALTH: VERY GOOD
RATE_GENERAL_HEALTH: VERY GOOD
RATE_AVERAGE_FATIGUE: MILD
RATE_SOCIAL_SATISFACTION: EXCELLENT
RATE_MENTAL_HEALTH: VERY GOOD
EMOTIONAL_PROBLEMS: NEVER
CARRYOUT_SOCIAL_ACTIVITIES: EXCELLENT

## 2024-12-11 NOTE — ASSESSMENT & PLAN NOTE
Hx of spondylosis, Thoracic herniated discs.     Hip xray/prosthetic intact  Neg SLR but suspected radiculitis into buttock with complaint of b/l paresthesias.   No better with medrol pack.   Continue methocarbamol  No NSAIDS due to HTN  Rare use Norco 5/325 1-2 q6 h max 8/day 40 MED  Refill   Continue Chiropractic/PT.   HEP as tolerated.   Completed High dose prednisone burst and taper    [facility] reviewed 12/11/24 -- last fill 9/2024

## 2024-12-11 NOTE — PROGRESS NOTES
Wisam Olson Jr. is a 69 y.o. male with Chief Complaint of Annual Exam.    Low back pain improved from fall -- seeing chiropracter.    Then flared up after shoveling snow after thanksgiving.      Brother in law passed from Lung cancer 24.    Past Surgical History:   Procedure Laterality Date    COLONOSCOPY  2015    Complete Colonoscopy    JOINT REPLACEMENT      OTHER SURGICAL HISTORY  10/07/2021    Tonsillectomy with adenoidectomy    PROSTATE SURGERY      REFRACTIVE SURGERY  05/10/2013    Corneal LASIK    WISDOM TOOTH EXTRACTION        Social History     Socioeconomic History    Marital status: Single     Spouse name: Not on file    Number of children: Not on file    Years of education: Not on file    Highest education level: Not on file   Occupational History    Not on file   Tobacco Use    Smoking status: Former     Current packs/day: 0.00     Average packs/day: 1.5 packs/day for 27.0 years (40.5 ttl pk-yrs)     Types: Cigarettes     Start date: 1972     Quit date: 1999     Years since quittin.9    Smokeless tobacco: Never   Vaping Use    Vaping status: Never Used   Substance and Sexual Activity    Alcohol use: Yes     Alcohol/week: 6.0 standard drinks of alcohol     Types: 3 Cans of beer, 3 Standard drinks or equivalent per week    Drug use: Never    Sexual activity: Yes     Partners: Female     Birth control/protection: None   Other Topics Concern    Not on file   Social History Narrative    Not on file     Social Drivers of Health     Financial Resource Strain: Not on file   Food Insecurity: Not on file   Transportation Needs: Not on file   Physical Activity: Not on file   Stress: Not on file   Social Connections: Not on file   Intimate Partner Violence: Not on file   Housing Stability: Not on file     Past Medical History:   Diagnosis Date    Anemia     BPH with obstruction/lower urinary tract symptoms 2023    s/p HoLEP removal - [facility] ([facility])  OFF finasteride. OFF afluzosin     Contact with and (suspected) exposure to covid-19     Exposure to COVID-19 virus    Fever, unspecified 01/15/2021    Low grade fever    HL (hearing loss)     Hypogonadism male 11/06/2023    Fatigue and low testosterone (300) in the past. Still low 303 spring 2016.   Discussed plusses and minuses of testosterone supplements.   May help ed and fatigue but may worsen bph, prostate cancer, high red cells, and heart disease.     Pain in unspecified foot 02/23/2015    Heel pain    Personal history of other diseases of the digestive system     History of gastrointestinal hemorrhage    Personal history of other diseases of the respiratory system 01/22/2021    History of upper respiratory infection    Personal history of pneumonia (recurrent) 04/24/2019    History of community acquired pneumonia    Plantar fasciitis 11/06/2023    Shortness of breath     SOB (shortness of breath) on exertion    Trigger finger, unspecified finger 11/15/2016    Acquired trigger finger    Unspecified injury of thorax, initial encounter 06/20/2018    Rib injury    Unspecified injury of unspecified foot, initial encounter 02/23/2015    Injury of heel    Unspecified injury of unspecified wrist, hand and finger(s), initial encounter 04/14/2014    Wrist injury    Unspecified injury of unspecified wrist, hand and finger(s), initial encounter 04/24/2014    Hand injury      Family History   Problem Relation Name Age of Onset    Aneurysm Mother      Alzheimer's disease Father Wisam     Hearing loss Father Wisam     Alcohol abuse Brother Misha-O     Drug abuse Brother Misha-O     Diabetes Paternal Grandfather Lencho     Heart disease Paternal Grandfather Lencho     Breast cancer Mother's Sister Madyson         Review of Systems   Constitutional:  Negative for chills, fatigue and fever.   HENT:  Negative for rhinorrhea and sore throat.    Eyes:  Negative for pain and redness.   Respiratory:  Negative for cough and shortness of breath.    Cardiovascular:  " Negative for chest pain and palpitations.   Gastrointestinal:  Negative for abdominal pain and blood in stool.   Endocrine: Negative for polydipsia and polyuria.   Genitourinary:  Negative for dysuria and hematuria.   Musculoskeletal:  Positive for back pain. Negative for neck stiffness.   Skin:  Negative for rash and wound.   Allergic/Immunologic: Negative for environmental allergies and food allergies.   Neurological:  Negative for weakness and headaches.   Hematological:  Negative for adenopathy. Does not bruise/bleed easily.   Psychiatric/Behavioral:  Negative for hallucinations and suicidal ideas.       /74   Pulse 57   Temp 36.4 °C (97.5 °F)   Ht 1.753 m (5' 9\")   Wt 95.7 kg (211 lb)   SpO2 98%   BMI 31.16 kg/m²   Physical Exam  Vitals reviewed.   Constitutional:       General: He is not in acute distress.     Appearance: He is not ill-appearing.   HENT:      Head: Normocephalic and atraumatic.      Right Ear: Tympanic membrane normal.      Left Ear: Tympanic membrane normal.      Nose: No congestion or rhinorrhea.      Mouth/Throat:      Pharynx: No oropharyngeal exudate or posterior oropharyngeal erythema.   Eyes:      Extraocular Movements: Extraocular movements intact.      Conjunctiva/sclera: Conjunctivae normal.      Pupils: Pupils are equal, round, and reactive to light.   Cardiovascular:      Rate and Rhythm: Normal rate and regular rhythm.      Heart sounds: No murmur heard.     No friction rub. No gallop.   Pulmonary:      Effort: Pulmonary effort is normal.      Breath sounds: Normal breath sounds. No wheezing, rhonchi or rales.   Abdominal:      General: There is no distension.      Palpations: Abdomen is soft.      Tenderness: There is no abdominal tenderness. There is no guarding or rebound.   Musculoskeletal:         General: No swelling or deformity.      Cervical back: Normal range of motion and neck supple.      Right lower leg: No edema.      Left lower leg: No edema.   Skin:    " " Capillary Refill: Capillary refill takes less than 2 seconds.      Coloration: Skin is not jaundiced.      Findings: No rash.   Neurological:      General: No focal deficit present.      Mental Status: He is alert.      Motor: No weakness.   Psychiatric:         Mood and Affect: Mood normal.         Behavior: Behavior normal.       Lab Results   Component Value Date    WBC 6.4 12/22/2023    HGB 11.9 (L) 12/22/2023    HCT 38.3 (L) 12/22/2023    MCV 68 (L) 12/22/2023     12/22/2023     Lab Results   Component Value Date    CHOL 193 12/22/2023    CHOL 174 12/09/2022    CHOL 178 12/10/2021     Lab Results   Component Value Date    HDL 48.1 12/22/2023    HDL 49.0 12/09/2022    HDL 42.3 12/10/2021     Lab Results   Component Value Date    LDLCALC 124 (H) 12/22/2023     Lab Results   Component Value Date    TRIG 104 12/22/2023    TRIG 91 12/10/2021    TRIG 86 12/09/2020     No components found for: \"CHOLHDL\"  No results found for: \"HGBA1C\"    Assessment/Plan   Problem List Items Addressed This Visit       Benign prostatic hyperplasia without lower urinary tract symptoms    Overview     9/22/2022 s/p HoLEP removal - [facility] ([facility])  OFF finasteride. OFF afluzosin         Current Assessment & Plan     Symptoms resolved.          CPAP (continuous positive airway pressure) dependence    Current Assessment & Plan     Stable on current nightly use.  Continue current CPAP.          Hyperlipidemia    Overview     11/30/23 CACS 5         Current Assessment & Plan     Monitor lipids.         Hypothyroidism    Current Assessment & Plan     Cotnineu 75 mcg lt4 monitor TSH.          Obstructive sleep apnea    Overview     CPAP with nasal pillows.  SLEEP (Nat)          Osteoarthritis (arthritis due to wear and tear of joints)    Overview     Right elbow.  Non-surgical.         Medicare annual wellness visit, subsequent - Primary    Overview     12/11/24 Preventative exam performed           Current Assessment & Plan     12/11/24 "  Preventative exam -- Check labs.   For overweight/class 1 obesity -- weight loss tips provided. encourage regular exercise.    11/25/23 COLONOSCOPY ([facility]) Diverticula only. Repeat 10y in 2033.    11/30/23 CACS 5, + incidental 3 mm LLL nodule.   12/2023 ESQUEDA 3.6%         Elevated BP without diagnosis of hypertension    Overview     4/2024 Likely due to NSAID use.          Current Assessment & Plan     Discontinued oral NSAIDS.  Will use tylenol 500 mg up to 6/day  Topical voltaren gel  Muscle relaxer to control pain at night.   If no improvement then can consider starting BP med.           Acute midline low back pain with bilateral sciatica    Overview     Acute onset mid-July 2024    Started Chiropractic/PT modalities since 9/6/24         Current Assessment & Plan     Hx of spondylosis, Thoracic herniated discs.     Hip xray/prosthetic intact  Neg SLR but suspected radiculitis into buttock with complaint of b/l paresthesias.   No better with medrol pack.   Continue methocarbamol  No NSAIDS due to HTN  Rare use Norco 5/325 1-2 q6 h max 8/day 40 MED  Refill   Continue Chiropractic/PT.   HEP as tolerated.   Completed High dose prednisone burst and taper    [facility] reviewed 12/11/24 -- last fill 9/2024          Other Visit Diagnoses       Hypothyroidism, unspecified        Lung nodule        Screening for prostate cancer

## 2024-12-11 NOTE — ASSESSMENT & PLAN NOTE
12/11/24  Preventative exam -- Check labs.   For overweight/class 1 obesity -- weight loss tips provided. encourage regular exercise.    11/25/23 COLONOSCOPY ([facility]) Diverticula only. Repeat 10y in 2033.    11/30/23 CACS 5, + incidental 3 mm LLL nodule.   12/2023 ESQUEDA 3.6%

## 2024-12-11 NOTE — ASSESSMENT & PLAN NOTE
Discontinued oral NSAIDS.  Will use tylenol 500 mg up to 6/day  Topical voltaren gel  Muscle relaxer to control pain at night.   If no improvement then can consider starting BP med.